# Patient Record
Sex: FEMALE | Race: OTHER | HISPANIC OR LATINO | Employment: FULL TIME | URBAN - METROPOLITAN AREA
[De-identification: names, ages, dates, MRNs, and addresses within clinical notes are randomized per-mention and may not be internally consistent; named-entity substitution may affect disease eponyms.]

---

## 2018-10-05 ENCOUNTER — OFFICE VISIT (OUTPATIENT)
Dept: URGENT CARE | Age: 23
End: 2018-10-05
Payer: COMMERCIAL

## 2018-10-05 VITALS
SYSTOLIC BLOOD PRESSURE: 124 MMHG | DIASTOLIC BLOOD PRESSURE: 81 MMHG | HEART RATE: 99 BPM | OXYGEN SATURATION: 98 % | TEMPERATURE: 97.8 F | HEIGHT: 64 IN | RESPIRATION RATE: 20 BRPM

## 2018-10-05 DIAGNOSIS — J06.9 UPPER RESPIRATORY INFECTION, VIRAL: Primary | ICD-10-CM

## 2018-10-05 PROCEDURE — 99283 EMERGENCY DEPT VISIT LOW MDM: CPT | Performed by: FAMILY MEDICINE

## 2018-10-05 PROCEDURE — G0382 LEV 3 HOSP TYPE B ED VISIT: HCPCS | Performed by: FAMILY MEDICINE

## 2018-10-05 RX ORDER — PSEUDOEPHEDRINE HCL 60 MG/1
60 TABLET ORAL EVERY 6 HOURS PRN
Qty: 30 TABLET | Refills: 0 | Status: SHIPPED | OUTPATIENT
Start: 2018-10-05 | End: 2019-05-29

## 2018-10-05 RX ORDER — FLUTICASONE PROPIONATE 50 MCG
1 SPRAY, SUSPENSION (ML) NASAL DAILY
Qty: 16 G | Refills: 0 | Status: SHIPPED | OUTPATIENT
Start: 2018-10-05 | End: 2019-05-29

## 2018-10-05 NOTE — PATIENT INSTRUCTIONS
Prescriptions sent to pharmacy for pseudoephedrine and Flonase-use as directed  Cool mist humidifier, saline nasal spray  Tylenol/ibuprofen as needed for fever or pain  Follow up with PCP in 3-5 days  Proceed to  ER if symptoms worsen  Síntomas de resfriado   CUIDADO AMBULATORIO:   Síntomas de resfriado  incluyen estornudos, garganta seca, congestión nasal, dolor de [place], ojos [place], y tos  La tos podría ser seca o incluso contener flemas  Usted también podría sentir mauro musculares, dolor en las articulaciones y cansancio  La fiebre no es un síntoma común del resfrío tuyet podría suceder  Los síntomas del resfriado pueden ocurrir debido a la inflamación de las vías respiratorias altas causada por un virus  La mayoría de los resfriados se RUST sin tratamiento  Busque atención médica de inmediato si:   · Usted siente más cansancio y debilidad  · Usted no puede comer  · Seymour corazón está latiendo [employer] rápido de lo normal en seymour elmer  · Usted nota manchas michael en la parte de atrás de seymour garganta y seymour cristin está inflamado y adolorido al tacto  · Usted nota puntos rojos o morados pequeños o grandes en seymour piel  Pregúntele a seymour Rogue Beady vitaminas y minerales son adecuados para usted  · Usted tiene fiebre por encima de los 102°F (38 9°C)  · Usted tiene falta de la aire que es reciente o que [place]  · Usted tiene veronica secreción espesa saliéndole de la nariz por más de 2 días  · Judy síntomas no mejoran o más [place] empeoran en cuestión de 5 días  · Usted tiene preguntas o inquietudes acerca de seymour condición o cuidado  El tratamiento para los síntomas de un resfriado  pueden incluir los analgésicos antiinflamatorios no esteroides para disminuir el dolor en los músculos y la [place]   Los medicamentos para el resfriado también se pueden administrar para disminuir la tos, la congestión nasal, el estornudo y goteo nasal    Maneje los síntomas del resfriado:  Los siguientes podría ayudar a [employer] del resfrío, real por ejemplo la garganta seca y la congestión:  · Gárgaras con enjuague bucal o agua salada tibia según las instrucciones  · Pastillas para la garganta o golosinas duras  · Vaporizador de aristeo fría o tibia o humidificador para aliviar la respiración  · [place] de por lo menos 2 días y luego según lo necesario para ir eliminando el cansancio y la debilidad  · Póngase crema a base de petrolato alrededor de las fosas nasales para disminuir la irritación causada por estarse limpiando tanto la nariz  · [place] suficiente líquidos  Los líquidos le ayudarán a aflojar y disolver la mucosidad espesa para que la pueda expectorar  Los líquidos también lo mantendrán hidratado  Pregúntele a vizcarra médico cuáles líquidos le recomienda y cuánta cantidad debe niles cada día  Prevenga la propagación de gérmenes  al lavarse las enedelia con frecuencia  Es posible propagar los gérmenes del resfriado a otras personas por lo menos por 3 días después de isaura iniciado los síntomas  No comparta artículos, real utensilios de cocina  Cúbrase la nariz y la boca cuando tose o estornuda usando la parte de adentro del codo en vez de las enedelia  Tire en la basura las toallas desechables que usó para limpiarse la nariz  No fume:  El fumar podría empeorar ernie síntomas y aumentar la duración de vizcarra resfriado  Hable con vizcarra médico si necesita ayuda para dejar de fumar  Acuda a ernie consultas de control con vizcarra médico según le indicaron  Anote ernie preguntas para que se acuerde de hacerlas regino ernie visitas  © 2017 [address] [employer] Information is for End User's use only and may not be sold, redistributed or otherwise used for commercial purposes  All illustrations and images included in CareNotes® are the copyrighted property of A D A M , Inc  or Mau Sánchez  Esta información es sólo para uso en educación   Vizcarra intención no es darle un consejo [employer] enfermedades o tratamientos  Colsulte con seymour Jose Pinta farmacéutico antes de seguir cualquier régimen médico para saber si es seguro y efectivo para usted

## 2018-10-05 NOTE — PROGRESS NOTES
[facility]        NAME: Yayo More is a 26 y o  female  : 1995    MRN: [id number]  DATE: 2018  TIME: 1:10 PM    Assessment and Plan   Upper respiratory infection, viral [J06 9]  1  Upper respiratory infection, viral  pseudoephedrine (SUDAFED) 60 mg tablet    fluticasone (FLONASE) 50 mcg/act nasal spray         Patient Instructions   Prescriptions sent to pharmacy for pseudoephedrine and Flonase-use as directed  Cool mist humidifier, saline nasal spray  Tylenol/ibuprofen as needed for fever or pain  Follow up with PCP in 3-5 days  Proceed to  ER if symptoms worsen  Chief Complaint     Chief Complaint   Patient presents with    Sore Throat     and ear ache started yesterday          History of Present Illness   The patient is a 59-year-old female who presents with cold-like symptoms that started yesterday  The she complains of nasal and sinus congestion  Negative sinus point tenderness  Positive bilateral ear ache  Negative hearing changes  Negative drainage  Negative cough  Negative shortness of breath or wheezing  Negative abdominal pain, nausea, vomiting or diarrhea  Negative fever or chills  Negative myalgias  HPI    Review of Systems   Review of Systems   Constitutional: Negative for activity change, chills, fatigue and fever  HENT: Positive for congestion, postnasal drip, rhinorrhea, sinus pressure and sore throat  Negative for ear discharge, ear pain, facial swelling, mouth sores, sinus pain, sneezing and trouble swallowing  Eyes: Negative for pain, discharge, redness and itching  Respiratory: Negative for apnea, cough, chest tightness, shortness of breath, wheezing and stridor  Cardiovascular: Negative for chest pain  Gastrointestinal: Negative for abdominal distention, abdominal pain, diarrhea, nausea and vomiting  Genitourinary: Negative for difficulty urinating  Musculoskeletal: Negative for arthralgias and myalgias     Skin: Negative for pallor and rash  Allergic/Immunologic: Negative  Neurological: Negative for dizziness, light-headedness and headaches  Hematological: Negative  Psychiatric/Behavioral: Negative  All other systems reviewed and are negative  Current Medications       Current Outpatient Prescriptions:     fluticasone (FLONASE) 50 mcg/act nasal spray, 1 spray into each nostril daily, Disp: 16 g, Rfl: 0    pseudoephedrine (SUDAFED) 60 mg tablet, Take 1 tablet (60 mg total) by mouth every 6 (six) hours as needed for congestion, Disp: 30 tablet, Rfl: 0    Current Allergies     Allergies as of 10/05/2018    (No Known Allergies)            The following portions of the patient's history were reviewed and updated as appropriate: allergies, current medications, past family history, past medical history, past social history, past surgical history and problem list      History reviewed  No pertinent past medical history  History reviewed  No pertinent surgical history  No family history on file  Medications have been verified  Objective   /81   Pulse 99   Temp 97 8 °F (36 6 °C)   Resp 20   Ht 5' 4" (1 626 m)   SpO2 98%        Physical Exam     Physical Exam   Constitutional: She is oriented to person, place, and time  She appears well-developed and well-nourished  No distress  HENT:   Head: Normocephalic and atraumatic  Right Ear: Hearing, tympanic membrane, external ear and ear canal normal  No drainage or tenderness  Tympanic membrane is not perforated, not erythematous, not retracted and not bulging  No middle ear effusion  No decreased hearing is noted  Left Ear: Hearing, tympanic membrane, external ear and ear canal normal  No drainage or tenderness  Tympanic membrane is not perforated, not erythematous, not retracted and not bulging  No middle ear effusion  No decreased hearing is noted  Nose: Rhinorrhea present  No mucosal edema or septal deviation   Right sinus exhibits no maxillary sinus tenderness and no frontal sinus tenderness  Left sinus exhibits no maxillary sinus tenderness and no frontal sinus tenderness  Mouth/Throat: Uvula is midline and mucous membranes are normal  No oral lesions  No dental abscesses or uvula swelling  Posterior oropharyngeal erythema present  No oropharyngeal exudate, posterior oropharyngeal edema or tonsillar abscesses  Eyes: Pupils are equal, round, and reactive to light  Conjunctivae and EOM are normal    Neck: Trachea normal, normal range of motion and full passive range of motion without pain  Neck supple  No JVD present  No edema and no erythema present  No thyromegaly present  Cardiovascular: Normal rate, regular rhythm, S1 normal, S2 normal, normal heart sounds, intact distal pulses and normal pulses  No murmur heard  Pulmonary/Chest: Effort normal and breath sounds normal  No accessory muscle usage or stridor  No respiratory distress  She has no wheezes  Abdominal: Soft  Normal appearance and bowel sounds are normal  She exhibits no distension  There is no hepatosplenomegaly  There is no tenderness  Musculoskeletal: Normal range of motion  She exhibits no edema  Neurological: She is alert and oriented to person, place, and time  Skin: Skin is warm, dry and intact  No abrasion, no lesion and no rash noted  She is not diaphoretic  No cyanosis or erythema  Nails show no clubbing  Psychiatric: She has a normal mood and affect  Her speech is normal and behavior is normal    Nursing note and vitals reviewed

## 2018-10-05 NOTE — LETTER
October 5, 2018     Patient: Aquilino Shelley   YOB: 1995   Date of Visit: 10/5/2018       To Whom it May Concern:    Aquilino Shelley is under my professional care  She was seen in my office on 10/5/2018  She may return to work 10/6/2018  If you have any questions or concerns, please don't hesitate to call           Sincerely,          Selene Mai PA-C        CC: No Recipients

## 2019-05-29 ENCOUNTER — HOSPITAL ENCOUNTER (EMERGENCY)
Facility: HOSPITAL | Age: 24
Discharge: HOME | End: 2019-05-29
Attending: EMERGENCY MEDICINE
Payer: COMMERCIAL

## 2019-05-29 VITALS
TEMPERATURE: 98.3 F | RESPIRATION RATE: 16 BRPM | WEIGHT: 196 LBS | BODY MASS INDEX: 33.64 KG/M2 | DIASTOLIC BLOOD PRESSURE: 77 MMHG | HEART RATE: 81 BPM | SYSTOLIC BLOOD PRESSURE: 119 MMHG | OXYGEN SATURATION: 99 %
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
## 2019-05-29 PROCEDURE — 99282 EMERGENCY DEPT VISIT SF MDM: CPT

## 2019-05-29 PROCEDURE — 99282 EMERGENCY DEPT VISIT SF MDM: CPT | Performed by: PHYSICIAN ASSISTANT

## 2019-05-29 RX ORDER — DIPHENHYDRAMINE HCL 25 MG
25 TABLET ORAL EVERY 6 HOURS
Qty: 20 TABLET | Refills: 0 | Status: SHIPPED | OUTPATIENT
Start: 2019-05-29

## 2020-07-10 ENCOUNTER — OFFICE VISIT (OUTPATIENT)
Dept: URGENT CARE | Age: 25
End: 2020-07-10
Payer: COMMERCIAL

## 2020-07-10 VITALS
BODY MASS INDEX: 31.58 KG/M2 | HEART RATE: 72 BPM | OXYGEN SATURATION: 99 % | RESPIRATION RATE: 14 BRPM | WEIGHT: 185 LBS | TEMPERATURE: 97.8 F | HEIGHT: 64 IN

## 2020-07-10 DIAGNOSIS — Z20.828 EXPOSURE TO SARS-ASSOCIATED CORONAVIRUS: Primary | ICD-10-CM

## 2020-07-10 PROCEDURE — 99283 EMERGENCY DEPT VISIT LOW MDM: CPT | Performed by: PHYSICIAN ASSISTANT

## 2020-07-10 PROCEDURE — 99203 OFFICE O/P NEW LOW 30 MIN: CPT | Performed by: PHYSICIAN ASSISTANT

## 2020-07-10 PROCEDURE — G0382 LEV 3 HOSP TYPE B ED VISIT: HCPCS | Performed by: PHYSICIAN ASSISTANT

## 2020-07-10 PROCEDURE — U0003 INFECTIOUS AGENT DETECTION BY NUCLEIC ACID (DNA OR RNA); SEVERE ACUTE RESPIRATORY SYNDROME CORONAVIRUS 2 (SARS-COV-2) (CORONAVIRUS DISEASE [COVID-19]), AMPLIFIED PROBE TECHNIQUE, MAKING USE OF HIGH THROUGHPUT TECHNOLOGIES AS DESCRIBED BY CMS-2020-01-R: HCPCS | Performed by: PHYSICIAN ASSISTANT

## 2020-07-10 NOTE — PROGRESS NOTES
[facility]      NAME: Fatemeh Reyna is a 26 y o  female  : 1995    MRN: [id number]  DATE: July 10, 2020  TIME: 2:51 PM    Assessment and Plan   Exposure to SARS-associated coronavirus [Z20 828]  1  Exposure to SARS-associated coronavirus  MISC COVID-19 TEST- Office Collection       Patient Instructions     Follow up with PCP in 3-5 days  Proceed to  ER if symptoms worsen  Patient tested for COVID  Patient will be under strict isolation until otherwise stated  Tylenol as needed for fever or chills  Monitor symptoms closely  Chief Complaint     Chief Complaint   Patient presents with   31 60 98     pt was exposed to someone at work and while traveling with covid  pt states she now has a headache  denies any other symptoms         History of Present Illness       Patient is a 23-year-old female presenting to the office for cough  Patient states that she was recently in [place] came in contact with a person who tested positive for COVID  Patient states that the cough is dry and nonproductive in nature  Patient denies any fevers any chills  Patient denies any problems with her eyes ears nose throat  Patient has any shortness of breath chest tightness chest pain  Patient denies any nausea vomiting diarrhea  Patient has any muscle aches body aches  Patient denies any headache, neck pain, neck stiffness, dizziness, confusion  Patient offers no other complaints this time  Review of Systems   Review of Systems   Constitutional: Negative  HENT: Negative  Eyes: Negative  Respiratory: Positive for cough  Negative for apnea, choking, chest tightness, shortness of breath, wheezing and stridor  Cardiovascular: Negative  Gastrointestinal: Negative  Endocrine: Negative  Genitourinary: Negative  Musculoskeletal: Negative  Skin: Negative  Allergic/Immunologic: Negative  Neurological: Negative  Hematological: Negative      Psychiatric/Behavioral: Negative  Current Medications       Current Outpatient Medications:     diphenhydrAMINE (BENADRYL) 25 mg tablet, Take 1 tablet (25 mg total) by mouth every 6 (six) hours, Disp: 20 tablet, Rfl: 0    Current Allergies     Allergies as of 07/10/2020    (No Known Allergies)            The following portions of the patient's history were reviewed and updated as appropriate: allergies, current medications, past family history, past medical history, past social history, past surgical history and problem list      No past medical history on file  No past surgical history on file  No family history on file  Medications have been verified  Objective   Ht 5' 4" (1 626 m)   Wt 83 9 kg (185 lb)   BMI 31 76 kg/m²        Physical Exam     Physical Exam   Constitutional: She is oriented to person, place, and time  She appears well-developed and well-nourished  HENT:   Head: Normocephalic  Right Ear: External ear normal    Left Ear: External ear normal    Nose: Nose normal    Mouth/Throat: Oropharynx is clear and moist    Eyes: Pupils are equal, round, and reactive to light  Conjunctivae and EOM are normal    Neck: Normal range of motion  Cardiovascular: Normal rate, regular rhythm, normal heart sounds and intact distal pulses  Pulmonary/Chest: Effort normal and breath sounds normal    Neurological: She is alert and oriented to person, place, and time  Skin: Skin is warm  Capillary refill takes less than 2 seconds  Psychiatric: She has a normal mood and affect  Her behavior is normal  Judgment and thought content normal    Nursing note and vitals reviewed

## 2020-07-10 NOTE — PATIENT INSTRUCTIONS
Follow up with PCP in 3-5 days  Proceed to  ER if symptoms worsen  Patient tested for COVID  Patient will be under strict isolation until otherwise stated  Tylenol as needed for fever or chills  Monitor symptoms closely  [address]    Your healthcare provider and/or public health staff have evaluated you and have determined that you do not need to remain in the hospital at this time  At this time you can be isolated at home where you will be monitored by staff from your local or state health department  You should carefully follow the prevention and isolation steps below until a healthcare provider or local or state health department says that you can return to your normal activities  Stay home except to get medical care    People who are mildly ill with COVID-19 are able to isolate at home during their illness  You should restrict activities outside your home, except for getting medical care  Do not go to work, school, or public areas  Avoid using public transportation, ride-sharing, or taxis  Separate yourself from other people and animals in your home    People: As much as possible, you should stay in a specific room and away from other people in your home  Also, you should use a separate bathroom, if available  Animals: You should restrict contact with pets and other animals while you are sick with COVID-19, just like you would around other people  Although there have not been reports of pets or other animals becoming sick with COVID-19, it is still recommended that people sick with COVID-19 limit contact with animals until more information is known about the virus  When possible, have another member of your household care for your animals while you are sick  If you are sick with COVID-19, avoid contact with your pet, including petting, snuggling, being kissed or licked, and sharing food   If you must care for your pet or be around animals while you are sick, wash your hands before and after you interact with pets and wear a facemask  See COVID-19 and Animals for more information  Call ahead before visiting your doctor    If you have a medical appointment, call the healthcare provider and tell them that you have or may have COVID-19  This will help the healthcare providers office take steps to keep other people from getting infected or exposed  Wear a facemask    You should wear a facemask when you are around other people (e g , sharing a room or vehicle) or pets and before you enter a healthcare providers office  If you are not able to wear a facemask (for example, because it causes trouble breathing), then people who live with you should not stay in the same room with you, or they should wear a facemask if they enter your room  Cover your coughs and sneezes    Cover your mouth and nose with a tissue when you cough or sneeze  Throw used tissues in a lined trash can  Immediately wash your hands with soap and water for at least 20 seconds or, if soap and water are not available, clean your hands with an alcohol-based hand  that contains at least 60% alcohol  Clean your hands often    Wash your hands often with soap and water for at least 20 seconds, especially after blowing your nose, coughing, or sneezing; going to the bathroom; and before eating or preparing food  If soap and water are not readily available, use an alcohol-based hand  with at least 60% alcohol, covering all surfaces of your hands and rubbing them together until they feel dry  Soap and water are the best option if hands are visibly dirty  Avoid touching your eyes, nose, and mouth with unwashed hands  Avoid sharing personal household items    You should not share dishes, drinking glasses, cups, eating utensils, towels, or bedding with other people or pets in your home  After using these items, they should be washed thoroughly with soap and water      Clean all high-touch surfaces everyday    High touch surfaces include counters, tabletops, doorknobs, bathroom fixtures, toilets, phones, keyboards, tablets, and bedside tables  Also, clean any surfaces that may have blood, stool, or body fluids on them  Use a household cleaning spray or wipe, according to the label instructions  Labels contain instructions for safe and effective use of the cleaning product including precautions you should take when applying the product, such as wearing gloves and making sure you have good ventilation during use of the product  Monitor your symptoms    Seek prompt medical attention if your illness is worsening (e g , difficulty breathing)  Before seeking care, call your healthcare provider and tell them that you have, or are being evaluated for, COVID-19  Put on a facemask before you enter the facility  These steps will help the healthcare providers office to keep other people in the office or waiting room from getting infected or exposed  Ask your healthcare provider to call the local or [facility] health department  Persons who are placed under active monitoring or facilitated self-monitoring should follow instructions provided by their local health department or occupational health professionals, as appropriate  If you have a medical emergency and need to call 911, notify the dispatch personnel that you have, or are being evaluated for COVID-19  If possible, put on a facemask before emergency medical services arrive      Discontinuing home isolation    Patients with confirmed COVID-19 should remain under home isolation precautions until the following conditions are met:   - They have had no fever for at least 72 hours (that is three full days of no fever without the use medicine that reduces fevers)  AND  - other symptoms have improved (for example, when their cough or shortness of breath have improved)  AND  - at least 10 days have passed since their symptoms first appeared  Patients with confirmed COVID-19 should also notify close contacts (including their workplace) and ask that they self-quarantine  Currently, close contact is defined as being within 6 feet for 10 minutes or more from the period 48 hours before symptom onset to the time at which the patient went into isolation  Close contacts of patients diagnosed with COVID-19 should be instructed by the patient to self-quarantine for 14 days from the last time of their last contact with the patient       Source: [employer]

## 2020-07-17 LAB — SARS-COV-2 RNA SPEC QL NAA+PROBE: NOT DETECTED

## 2020-07-18 ENCOUNTER — TELEPHONE (OUTPATIENT)
Dept: URGENT CARE | Age: 25
End: 2020-07-18

## 2020-07-20 ENCOUNTER — TELEPHONE (OUTPATIENT)
Dept: OTHER | Facility: OTHER | Age: 25
End: 2020-07-20

## 2020-07-20 NOTE — TELEPHONE ENCOUNTER
Your test for COVID-19, also known as novel coronavirus, came back negative  You do not have COVID-19  If you have any additional questions, we can schedule a virtual visit for you with a provider or call the [facility] hotline [phone] Option 7 for care advice  For additional information , please visit the Coronavirus FAQ on the [address] [facility]  Patient is feeling fine, has no further needs at this time

## 2020-11-11 ENCOUNTER — HOSPITAL ENCOUNTER (EMERGENCY)
Facility: HOSPITAL | Age: 25
Discharge: HOME | End: 2020-11-11
Attending: EMERGENCY MEDICINE | Admitting: EMERGENCY MEDICINE
Payer: COMMERCIAL

## 2020-11-11 VITALS
WEIGHT: 205.25 LBS | RESPIRATION RATE: 16 BRPM | SYSTOLIC BLOOD PRESSURE: 124 MMHG | HEART RATE: 77 BPM | DIASTOLIC BLOOD PRESSURE: 60 MMHG | TEMPERATURE: 98.8 F | OXYGEN SATURATION: 100 %

## 2020-11-11 DIAGNOSIS — B34.9 VIRAL ILLNESS: Primary | ICD-10-CM

## 2020-11-11 PROCEDURE — 99282 EMERGENCY DEPT VISIT SF MDM: CPT | Performed by: EMERGENCY MEDICINE

## 2020-11-11 PROCEDURE — 99283 EMERGENCY DEPT VISIT LOW MDM: CPT

## 2020-11-11 PROCEDURE — 87637 SARSCOV2&INF A&B&RSV AMP PRB: CPT | Performed by: PHYSICIAN ASSISTANT

## 2020-11-11 RX ORDER — IBUPROFEN 400 MG/1
400 TABLET ORAL ONCE
Status: COMPLETED | OUTPATIENT
Start: 2020-11-11 | End: 2020-11-11

## 2020-11-11 RX ADMIN — IBUPROFEN 400 MG: 400 TABLET ORAL at 18:43

## 2020-11-14 LAB
FLUAV RNA NPH QL NAA+PROBE: NOT DETECTED
FLUBV RNA NPH QL NAA+PROBE: NOT DETECTED
RSV RNA NPH QL NAA+PROBE: NOT DETECTED
SARS-COV-2 RNA NPH QL NAA+PROBE: NOT DETECTED

## 2020-12-18 ENCOUNTER — OCCMED (OUTPATIENT)
Dept: URGENT CARE | Facility: MEDICAL CENTER | Age: 25
End: 2020-12-18
Payer: OTHER MISCELLANEOUS

## 2020-12-18 ENCOUNTER — APPOINTMENT (OUTPATIENT)
Dept: RADIOLOGY | Facility: MEDICAL CENTER | Age: 25
End: 2020-12-18
Attending: PHYSICIAN ASSISTANT
Payer: OTHER MISCELLANEOUS

## 2020-12-18 DIAGNOSIS — S99.922A INJURY OF LEFT FOOT, INITIAL ENCOUNTER: Primary | ICD-10-CM

## 2020-12-18 DIAGNOSIS — S99.922A INJURY OF LEFT FOOT, INITIAL ENCOUNTER: ICD-10-CM

## 2020-12-18 PROCEDURE — 73630 X-RAY EXAM OF FOOT: CPT

## 2020-12-18 PROCEDURE — 99283 EMERGENCY DEPT VISIT LOW MDM: CPT | Performed by: PHYSICIAN ASSISTANT

## 2020-12-18 PROCEDURE — G0382 LEV 3 HOSP TYPE B ED VISIT: HCPCS | Performed by: PHYSICIAN ASSISTANT

## 2021-02-16 ENCOUNTER — APPOINTMENT (EMERGENCY)
Dept: CT IMAGING | Facility: HOSPITAL | Age: 26
End: 2021-02-16
Payer: COMMERCIAL

## 2021-02-16 ENCOUNTER — APPOINTMENT (EMERGENCY)
Dept: ULTRASOUND IMAGING | Facility: HOSPITAL | Age: 26
End: 2021-02-16
Payer: COMMERCIAL

## 2021-02-16 ENCOUNTER — HOSPITAL ENCOUNTER (EMERGENCY)
Facility: HOSPITAL | Age: 26
Discharge: HOME | End: 2021-02-16
Attending: EMERGENCY MEDICINE | Admitting: EMERGENCY MEDICINE
Payer: COMMERCIAL

## 2021-02-16 VITALS
RESPIRATION RATE: 14 BRPM | TEMPERATURE: 98.8 F | SYSTOLIC BLOOD PRESSURE: 120 MMHG | BODY MASS INDEX: 35 KG/M2 | WEIGHT: 205.03 LBS | HEIGHT: 64 IN | HEART RATE: 89 BPM | DIASTOLIC BLOOD PRESSURE: 67 MMHG | OXYGEN SATURATION: 100 %

## 2021-02-16 DIAGNOSIS — K29.70 GASTRITIS: ICD-10-CM

## 2021-02-16 DIAGNOSIS — R10.9 ABDOMINAL PAIN: Primary | ICD-10-CM

## 2021-02-16 LAB
ALBUMIN SERPL BCP-MCNC: 3.9 G/DL (ref 3.5–5)
ALP SERPL-CCNC: 71 U/L (ref 46–116)
ALT SERPL W P-5'-P-CCNC: 30 U/L (ref 12–78)
ANION GAP SERPL CALCULATED.3IONS-SCNC: 8 MMOL/L (ref 4–13)
AST SERPL W P-5'-P-CCNC: 20 U/L (ref 5–45)
BACTERIA UR QL AUTO: ABNORMAL /HPF
BASOPHILS # BLD AUTO: 0.01 THOUSANDS/ΜL (ref 0–0.1)
BASOPHILS NFR BLD AUTO: 0 % (ref 0–1)
BILIRUB SERPL-MCNC: 0.4 MG/DL (ref 0.2–1)
BILIRUB UR QL STRIP: NEGATIVE
BUN SERPL-MCNC: 16 MG/DL (ref 5–25)
CALCIUM SERPL-MCNC: 9.4 MG/DL (ref 8.3–10.1)
CHLORIDE SERPL-SCNC: 101 MMOL/L (ref 100–108)
CLARITY UR: CLEAR
CO2 SERPL-SCNC: 28 MMOL/L (ref 21–32)
COLOR UR: YELLOW
CREAT SERPL-MCNC: 1.03 MG/DL (ref 0.6–1.3)
EOSINOPHIL # BLD AUTO: 0 THOUSAND/ΜL (ref 0–0.61)
EOSINOPHIL NFR BLD AUTO: 0 % (ref 0–6)
ERYTHROCYTE [DISTWIDTH] IN BLOOD BY AUTOMATED COUNT: 13.3 % (ref 11.6–15.1)
EXT PREG TEST URINE: NORMAL
EXT. CONTROL ED NAV: NORMAL
GFR SERPL CREATININE-BSD FRML MDRD: 76 ML/MIN/1.73SQ M
GLUCOSE SERPL-MCNC: 111 MG/DL (ref 65–140)
GLUCOSE UR STRIP-MCNC: NEGATIVE MG/DL
HCT VFR BLD AUTO: 42 % (ref 34.8–46.1)
HGB BLD-MCNC: 13.7 G/DL (ref 11.5–15.4)
HGB UR QL STRIP.AUTO: ABNORMAL
IMM GRANULOCYTES # BLD AUTO: 0.03 THOUSAND/UL (ref 0–0.2)
IMM GRANULOCYTES NFR BLD AUTO: 0 % (ref 0–2)
KETONES UR STRIP-MCNC: NEGATIVE MG/DL
LEUKOCYTE ESTERASE UR QL STRIP: NEGATIVE
LIPASE SERPL-CCNC: 83 U/L (ref 73–393)
LYMPHOCYTES # BLD AUTO: 0.66 THOUSANDS/ΜL (ref 0.6–4.47)
LYMPHOCYTES NFR BLD AUTO: 7 % (ref 14–44)
MCH RBC QN AUTO: 30.5 PG (ref 26.8–34.3)
MCHC RBC AUTO-ENTMCNC: 32.6 G/DL (ref 31.4–37.4)
MCV RBC AUTO: 94 FL (ref 82–98)
MONOCYTES # BLD AUTO: 0.34 THOUSAND/ΜL (ref 0.17–1.22)
MONOCYTES NFR BLD AUTO: 4 % (ref 4–12)
NEUTROPHILS # BLD AUTO: 8.17 THOUSANDS/ΜL (ref 1.85–7.62)
NEUTS SEG NFR BLD AUTO: 89 % (ref 43–75)
NITRITE UR QL STRIP: NEGATIVE
NON-SQ EPI CELLS URNS QL MICRO: ABNORMAL /HPF
NRBC BLD AUTO-RTO: 0 /100 WBCS
PH UR STRIP.AUTO: 6 [PH]
PLATELET # BLD AUTO: 253 THOUSANDS/UL (ref 149–390)
PMV BLD AUTO: 10.8 FL (ref 8.9–12.7)
POTASSIUM SERPL-SCNC: 3.7 MMOL/L (ref 3.5–5.3)
PROT SERPL-MCNC: 8.5 G/DL (ref 6.4–8.2)
PROT UR STRIP-MCNC: NEGATIVE MG/DL
RBC # BLD AUTO: 4.49 MILLION/UL (ref 3.81–5.12)
RBC #/AREA URNS AUTO: ABNORMAL /HPF
SODIUM SERPL-SCNC: 137 MMOL/L (ref 136–145)
SP GR UR STRIP.AUTO: 1.02 (ref 1–1.03)
UROBILINOGEN UR QL STRIP.AUTO: 0.2 E.U./DL
WBC # BLD AUTO: 9.21 THOUSAND/UL (ref 4.31–10.16)
WBC #/AREA URNS AUTO: ABNORMAL /HPF

## 2021-02-16 PROCEDURE — 81025 URINE PREGNANCY TEST: CPT | Performed by: EMERGENCY MEDICINE

## 2021-02-16 PROCEDURE — 99283 EMERGENCY DEPT VISIT LOW MDM: CPT | Performed by: EMERGENCY MEDICINE

## 2021-02-16 PROCEDURE — 74177 CT ABD & PELVIS W/CONTRAST: CPT

## 2021-02-16 PROCEDURE — 85025 COMPLETE CBC W/AUTO DIFF WBC: CPT | Performed by: EMERGENCY MEDICINE

## 2021-02-16 PROCEDURE — 81001 URINALYSIS AUTO W/SCOPE: CPT | Performed by: EMERGENCY MEDICINE

## 2021-02-16 PROCEDURE — 96374 THER/PROPH/DIAG INJ IV PUSH: CPT

## 2021-02-16 PROCEDURE — 96361 HYDRATE IV INFUSION ADD-ON: CPT

## 2021-02-16 PROCEDURE — 80053 COMPREHEN METABOLIC PANEL: CPT | Performed by: EMERGENCY MEDICINE

## 2021-02-16 PROCEDURE — 76705 ECHO EXAM OF ABDOMEN: CPT

## 2021-02-16 PROCEDURE — 36415 COLL VENOUS BLD VENIPUNCTURE: CPT | Performed by: EMERGENCY MEDICINE

## 2021-02-16 PROCEDURE — 96375 TX/PRO/DX INJ NEW DRUG ADDON: CPT

## 2021-02-16 PROCEDURE — 99284 EMERGENCY DEPT VISIT MOD MDM: CPT

## 2021-02-16 PROCEDURE — 83690 ASSAY OF LIPASE: CPT | Performed by: EMERGENCY MEDICINE

## 2021-02-16 RX ORDER — LIDOCAINE HYDROCHLORIDE 20 MG/ML
15 SOLUTION OROPHARYNGEAL ONCE
Status: COMPLETED | OUTPATIENT
Start: 2021-02-16 | End: 2021-02-16

## 2021-02-16 RX ORDER — ONDANSETRON 4 MG/1
4 TABLET, ORALLY DISINTEGRATING ORAL EVERY 8 HOURS PRN
Qty: 10 TABLET | Refills: 0 | Status: SHIPPED | OUTPATIENT
Start: 2021-02-16 | End: 2021-02-21

## 2021-02-16 RX ORDER — MAGNESIUM HYDROXIDE/ALUMINUM HYDROXICE/SIMETHICONE 120; 1200; 1200 MG/30ML; MG/30ML; MG/30ML
30 SUSPENSION ORAL ONCE
Status: COMPLETED | OUTPATIENT
Start: 2021-02-16 | End: 2021-02-16

## 2021-02-16 RX ORDER — FAMOTIDINE 20 MG/1
20 TABLET, FILM COATED ORAL 2 TIMES DAILY
Qty: 10 TABLET | Refills: 0 | Status: SHIPPED | OUTPATIENT
Start: 2021-02-16 | End: 2021-02-21

## 2021-02-16 RX ORDER — MORPHINE SULFATE 4 MG/ML
4 INJECTION, SOLUTION INTRAMUSCULAR; INTRAVENOUS ONCE
Status: COMPLETED | OUTPATIENT
Start: 2021-02-16 | End: 2021-02-16

## 2021-02-16 RX ORDER — DICYCLOMINE HCL 20 MG
20 TABLET ORAL 2 TIMES DAILY
Qty: 10 TABLET | Refills: 0 | Status: SHIPPED | OUTPATIENT
Start: 2021-02-16 | End: 2021-02-21

## 2021-02-16 RX ORDER — ACETAMINOPHEN 325 MG/1
975 TABLET ORAL ONCE
Status: COMPLETED | OUTPATIENT
Start: 2021-02-16 | End: 2021-02-16

## 2021-02-16 RX ORDER — ONDANSETRON 2 MG/ML
4 INJECTION INTRAMUSCULAR; INTRAVENOUS ONCE
Status: COMPLETED | OUTPATIENT
Start: 2021-02-16 | End: 2021-02-16

## 2021-02-16 RX ADMIN — ALUMINUM HYDROXIDE, MAGNESIUM HYDROXIDE, AND SIMETHICONE 30 ML: 200; 200; 20 SUSPENSION ORAL at 20:39

## 2021-02-16 RX ADMIN — ACETAMINOPHEN 975 MG: 325 TABLET ORAL at 19:33

## 2021-02-16 RX ADMIN — MORPHINE SULFATE 4 MG: 4 INJECTION INTRAVENOUS at 17:09

## 2021-02-16 RX ADMIN — LIDOCAINE HYDROCHLORIDE 15 ML: 20 SOLUTION ORAL; TOPICAL at 20:39

## 2021-02-16 RX ADMIN — IODIXANOL 100 ML: 320 INJECTION, SOLUTION INTRAVASCULAR at 20:13

## 2021-02-16 RX ADMIN — ONDANSETRON 4 MG: 2 INJECTION INTRAMUSCULAR; INTRAVENOUS at 17:06

## 2021-02-16 RX ADMIN — FAMOTIDINE 20 MG: 10 INJECTION INTRAVENOUS at 20:39

## 2021-02-16 RX ADMIN — SODIUM CHLORIDE 1000 ML: 0.9 INJECTION, SOLUTION INTRAVENOUS at 17:04

## 2021-02-16 NOTE — ED PROVIDER NOTES
History  Chief Complaint   Patient presents with    Abdominal Pain     RUQ abd pain since 0600 with vomiting       History provided by:  Patient   used: No    Abdominal Pain  Pain location:  RUQ  Pain quality: aching    Pain radiates to:  Does not radiate  Pain severity:  Moderate  Onset quality:  Gradual  Duration:  12 hours  Timing:  Constant  Progression:  Waxing and waning  Chronicity:  New  Context comment:  RUQ pain with Vomiting since morning  Relieved by:  Nothing  Worsened by:  Nothing  Ineffective treatments:  None tried  Associated symptoms: vomiting    Associated symptoms: no chest pain, no chills, no cough, no diarrhea, no dysuria, no fever, no nausea, no shortness of breath and no sore throat    Vomiting:     Quality:  Stomach contents    Number of occurrences:  Unable to specify    Severity:  Moderate    Duration:  12 hours    Timing:  Intermittent    Progression:  Unchanged  Risk factors: not pregnant        Prior to Admission Medications   Prescriptions Last Dose Informant Patient Reported? Taking? diphenhydrAMINE (BENADRYL) 25 mg tablet Not Taking at Unknown time  No No   Sig: Take 1 tablet (25 mg total) by mouth every 6 (six) hours   Patient not taking: Reported on 2/16/2021      Facility-Administered Medications: None       Past Medical History:   Diagnosis Date    Prediabetes        History reviewed  No pertinent surgical history  History reviewed  No pertinent family history  I have reviewed and agree with the history as documented  E-Cigarette/Vaping     E-Cigarette/Vaping Substances     Social History     Tobacco Use    Smoking status: Never Smoker    Smokeless tobacco: Never Used   Substance Use Topics    Alcohol use: Not Currently    Drug use: Never       Review of Systems   Constitutional: Negative for chills and fever  HENT: Negative for facial swelling, sore throat and trouble swallowing  Eyes: Negative for pain and visual disturbance  Respiratory: Negative for cough and shortness of breath  Cardiovascular: Negative for chest pain and leg swelling  Gastrointestinal: Positive for abdominal pain and vomiting  Negative for diarrhea and nausea  Genitourinary: Negative for dysuria and flank pain  Musculoskeletal: Negative for back pain, neck pain and neck stiffness  Skin: Negative for pallor and rash  Allergic/Immunologic: Negative for environmental allergies and immunocompromised state  Neurological: Negative for dizziness and headaches  Hematological: Negative for adenopathy  Does not bruise/bleed easily  Psychiatric/Behavioral: Negative for agitation and behavioral problems  All other systems reviewed and are negative  Physical Exam  Physical Exam  Vitals signs and nursing note reviewed  Constitutional:       General: She is not in acute distress  Appearance: She is well-developed  HENT:      Head: Normocephalic and atraumatic  Neck:      Musculoskeletal: Normal range of motion and neck supple  Cardiovascular:      Rate and Rhythm: Normal rate and regular rhythm  Pulmonary:      Effort: Pulmonary effort is normal  No respiratory distress  Abdominal:      Palpations: Abdomen is soft  Tenderness: There is abdominal tenderness in the right upper quadrant  There is no guarding or rebound  Musculoskeletal: Normal range of motion  Skin:     General: Skin is warm and dry  Neurological:      Mental Status: She is alert and oriented to person, place, and time           Vital Signs  ED Triage Vitals [02/16/21 1630]   Temperature Pulse Respirations Blood Pressure SpO2   98 8 °F (37 1 °C) 105 18 (!) 175/85 99 %      Temp Source Heart Rate Source Patient Position - Orthostatic VS BP Location FiO2 (%)   Oral Monitor -- Right arm --      Pain Score       9           Vitals:    02/16/21 1630 02/16/21 1939   BP: (!) 175/85 120/67   Pulse: 105 89         Visual Acuity      ED Medications  Medications   sodium chloride 0 9 % bolus 1,000 mL (0 mL Intravenous Stopped 2/16/21 1935)   ondansetron (ZOFRAN) injection 4 mg (4 mg Intravenous Given 2/16/21 1706)   morphine (PF) 4 mg/mL injection 4 mg (4 mg Intravenous Given 2/16/21 1709)   acetaminophen (TYLENOL) tablet 975 mg (975 mg Oral Given 2/16/21 1933)   iodixanol (VISIPAQUE) 320 MG/ML injection 100 mL (100 mL Intravenous Given 2/16/21 2013)   famotidine (PEPCID) injection 20 mg (20 mg Intravenous Given 2/16/21 2039)   aluminum-magnesium hydroxide-simethicone (MYLANTA) oral suspension 30 mL (30 mL Oral Given 2/16/21 2039)   Lidocaine Viscous HCl (XYLOCAINE) 2 % mucosal solution 15 mL (15 mL Swish & Swallow Given 2/16/21 2039)       Diagnostic Studies  Results Reviewed     Procedure Component Value Units Date/Time    Comprehensive metabolic panel [[id number]]  (Abnormal) Collected: 02/16/21 1706    Lab Status: Final result Specimen: Blood from Arm, Right Updated: 02/16/21 1752     Sodium 137 mmol/L      Potassium 3 7 mmol/L      Chloride 101 mmol/L      CO2 28 mmol/L      ANION GAP 8 mmol/L      BUN 16 mg/dL      Creatinine 1 03 mg/dL      Glucose 111 mg/dL      Calcium 9 4 mg/dL      AST 20 U/L      ALT 30 U/L      Alkaline Phosphatase 71 U/L      Total Protein 8 5 g/dL      Albumin 3 9 g/dL      Total Bilirubin 0 40 mg/dL      eGFR 76 ml/min/1 73sq m     Narrative:      [place] guidelines for Chronic Kidney Disease (CKD):     Stage 1 with normal or high GFR (GFR > 90 mL/min/1 73 square meters)    Stage 2 Mild CKD (GFR = 60-89 mL/min/1 73 square meters)    Stage 3A Moderate CKD (GFR = 45-59 mL/min/1 73 square meters)    Stage 3B Moderate CKD (GFR = 30-44 mL/min/1 73 square meters)    Stage 4 Severe CKD (GFR = 15-29 mL/min/1 73 square meters)    Stage 5 End Stage CKD (GFR <15 mL/min/1 73 square meters)  Note: GFR calculation is accurate only with a steady state creatinine    Lipase [[id number]]  (Normal) Collected: 02/16/21 2151    Lab Status: Final result Specimen: Blood from Arm, Right Updated: 02/16/21 1752     Lipase 83 u/L     Urine Microscopic [[id number]]  (Abnormal) Collected: 02/16/21 1648    Lab Status: Final result Specimen: Urine, Clean Catch Updated: 02/16/21 1731     RBC, UA 2-4 /hpf      WBC, UA 0-1 /hpf      Epithelial Cells Occasional /hpf      Bacteria, UA Occasional /hpf     CBC and differential [[id number]]  (Abnormal) Collected: 02/16/21 1706    Lab Status: Final result Specimen: Blood from Arm, Right Updated: 02/16/21 1713     WBC 9 21 Thousand/uL      RBC 4 49 Million/uL      Hemoglobin 13 7 g/dL      Hematocrit 42 0 %      MCV 94 fL      MCH 30 5 pg      MCHC 32 6 g/dL      RDW 13 3 %      MPV 10 8 fL      Platelets 434 Thousands/uL      nRBC 0 /100 WBCs      Neutrophils Relative 89 %      Immat GRANS % 0 %      Lymphocytes Relative 7 %      Monocytes Relative 4 %      Eosinophils Relative 0 %      Basophils Relative 0 %      Neutrophils Absolute 8 17 Thousands/µL      Immature Grans Absolute 0 03 Thousand/uL      Lymphocytes Absolute 0 66 Thousands/µL      Monocytes Absolute 0 34 Thousand/µL      Eosinophils Absolute 0 00 Thousand/µL      Basophils Absolute 0 01 Thousands/µL     UA w Reflex to Microscopic w Reflex to Culture [[id number]]  (Abnormal) Collected: 02/16/21 1648    Lab Status: Final result Specimen: Urine, Clean Catch Updated: 02/16/21 1707     Color, UA Yellow     Clarity, UA Clear     Specific Gravity, UA 1 025     pH, UA 6 0     Leukocytes, UA Negative     Nitrite, UA Negative     Protein, UA Negative mg/dl      Glucose, UA Negative mg/dl      Ketones, UA Negative mg/dl      Urobilinogen, UA 0 2 E U /dl      Bilirubin, UA Negative     Blood, UA Trace-Intact    POCT pregnancy, urine [[id number]]  (Normal) Resulted: 02/16/21 1649    Lab Status: Final result Updated: 02/16/21 1649     EXT PREG TEST UR (Ref: Negative) Negative (-)     Control Valid                 CT abdomen pelvis with contrast   Final Result by Abbie Celis Kenzie Martinez MD (02/16 2029)   No acute findings with a normal gallbladder normal appendix identified  Workstation performed: [id number]         US gallbladder   Final Result by Grzegorz Harper DO (02/16 1923)   Pancreatic tail obscured by overlying bowel gas  Mild hepatosplenomegaly  No biliary ductal dilatation  Gallbladder polyps measuring up to 6 mm  According to current literature guidelines (JACR 2013;10:953-956) small polyps this size are benign and no workup or followup is needed  Unremarkable right kidney  Workstation performed: [id number]                    Procedures  Procedures         ED Course  ED Course as of Feb 16 2114   Tue Feb 16, 2021   1710 Leukocytes, UA: Negative   1710 Nitrite, UA: Negative   1710 Urine dip noted, preg negative  PREGNANCY TEST URINE: Negative (-)   1734 WBC: 9 21   1734 Hemoglobin: 13 7   1734 CBC noted  Platelet Count: 372   1755 Sodium: 137   1755 Potassium: 3 7   1755 BUN: 16   1755 Creatinine: 1 03   1755 AST: 20   1755 ALT: 30   1755 CMP, Lipase reviewed, no significant acute abnormality  Lipase: [address] does not show any acute abnormality in relation to gall bladder  1940 Patient re-evaluated, still has some right-sided abdominal pain, now describes a little to the right flank, will get CT scan abdomen pelvis  2034 CT scan does not show any acute abnormality  2034 We will treat with Pepcid, GI cocktail for possible gastritis, discharge with nonspecific upper abdominal pain, possible gastritis, treat with Pepcid, Bentyl, Zofran  SBIRT 22yo+      Most Recent Value   SBIRT (24 yo +)   In order to provide better care to our patients, we are screening all of our patients for alcohol and drug use  Would it be okay to ask you these screening questions?   No Filed at: 02/16/2021 1703                    MDM  Number of Diagnoses or Management Options  Abdominal pain: new and requires workup  Gastritis:   Diagnosis management comments: Patient is a 79-year-old female, comes in with complaints of right upper abdominal pain, vomiting since morning, denies fever, diarrhea, chest pain or dyspnea  On exam, patient appears uncomfortable due to pain, vital signs are noted, right upper quadrant tenderness noted, no right lower abdominal tenderness, negative Rovsing signs, negative psoas and obturator's signs  Impression:  Right upper quadrant pain, possible biliary colic, cholecystitis, will check labs, get ultrasound, IV fluids, pain meds, Zofran  Amount and/or Complexity of Data Reviewed  Clinical lab tests: reviewed and ordered  Tests in the radiology section of CPT®: ordered and reviewed  Tests in the medicine section of CPT®: ordered and reviewed  Independent visualization of images, tracings, or specimens: yes        Disposition  Final diagnoses:   Abdominal pain   Gastritis - possible     Time reflects when diagnosis was documented in both MDM as applicable and the Disposition within this note     Time User Action Codes Description Comment    2/16/2021  5:17 PM Wandalee Boor Add [R10 9] Abdominal pain     2/16/2021  8:35 PM Wandalee Boor Add [K29 70] Gastritis     2/16/2021  8:35 PM [place], [address] [K29 70] Gastritis possible      ED Disposition     ED Disposition Condition Date/Time Comment    Discharge Stable Tue Feb 16, 2021  8:35 PM [address] discharge to home/self care              Follow-up Information     Follow up With Specialties Details Why Contact Info    Valdo Ortiz DO Family Medicine Schedule an appointment as soon as possible for a visit   [address] [id number] [phone]            Discharge Medication List as of 2/16/2021  8:38 PM      START taking these medications    Details   dicyclomine (BENTYL) 20 mg tablet Take 1 tablet (20 mg total) by mouth 2 (two) times a day for 5 days, Starting Tue 2/16/2021, Until Sun 2/21/2021, Print      famotidine (PEPCID) 20 mg tablet Take 1 tablet (20 mg total) by mouth 2 (two) times a day for 5 days, Starting Tue 2/16/2021, Until Sun 2/21/2021, Print      ondansetron (ZOFRAN-ODT) 4 mg disintegrating tablet Take 1 tablet (4 mg total) by mouth every 8 (eight) hours as needed for nausea or vomiting for up to 5 days, Starting Tue 2/16/2021, Until Sun 2/21/2021, Print         CONTINUE these medications which have NOT CHANGED    Details   diphenhydrAMINE (BENADRYL) 25 mg tablet Take 1 tablet (25 mg total) by mouth every 6 (six) hours, Starting Wed 5/29/2019, Print           No discharge procedures on file      PDMP Review     None          ED Provider  Electronically Signed by           Teena Li MD  02/16/21 9329

## 2021-02-16 NOTE — Clinical Note
Lulu Luther was seen and treated in our emergency department on 2/16/2021  Diagnosis:     Yazan Mayorga  may return to work on return date  She may return on this date: 02/17/2021         If you have any questions or concerns, please don't hesitate to call        Sindhu Ramsey RN    ______________________________           _______________          _______________  Hospital Representative                              Date                                Time

## 2021-02-17 NOTE — ED NOTES
Patient transported to [facility] [address] Meredith Juan, [address] [place] [address]  02/16/21 2006

## 2022-07-10 ENCOUNTER — APPOINTMENT (EMERGENCY)
Dept: RADIOLOGY | Facility: HOSPITAL | Age: 27
End: 2022-07-10
Payer: COMMERCIAL

## 2022-07-10 ENCOUNTER — APPOINTMENT (EMERGENCY)
Dept: CT IMAGING | Facility: HOSPITAL | Age: 27
End: 2022-07-10
Payer: COMMERCIAL

## 2022-07-10 ENCOUNTER — HOSPITAL ENCOUNTER (EMERGENCY)
Facility: HOSPITAL | Age: 27
Discharge: HOME | End: 2022-07-10
Attending: EMERGENCY MEDICINE
Payer: COMMERCIAL

## 2022-07-10 VITALS
OXYGEN SATURATION: 99 % | TEMPERATURE: 98.1 F | HEART RATE: 77 BPM | RESPIRATION RATE: 18 BRPM | SYSTOLIC BLOOD PRESSURE: 144 MMHG | DIASTOLIC BLOOD PRESSURE: 97 MMHG
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
## 2022-07-10 DIAGNOSIS — S39.012A STRAIN OF LUMBAR REGION, INITIAL ENCOUNTER: ICD-10-CM

## 2022-07-10 DIAGNOSIS — S09.90XA INJURY OF HEAD, INITIAL ENCOUNTER: ICD-10-CM

## 2022-07-10 DIAGNOSIS — S16.1XXA STRAIN OF NECK MUSCLE, INITIAL ENCOUNTER: ICD-10-CM

## 2022-07-10 LAB
BACTERIA UR QL AUTO: ABNORMAL /HPF
BILIRUB UR QL STRIP: NEGATIVE
CLARITY UR: CLEAR
COLOR UR: YELLOW
EXT PREG TEST URINE: NEGATIVE
EXT. CONTROL ED NAV: NORMAL
GLUCOSE UR STRIP-MCNC: NEGATIVE MG/DL
HGB UR QL STRIP.AUTO: ABNORMAL
KETONES UR STRIP-MCNC: NEGATIVE MG/DL
LEUKOCYTE ESTERASE UR QL STRIP: NEGATIVE
NITRITE UR QL STRIP: NEGATIVE
NON-SQ EPI CELLS URNS QL MICRO: ABNORMAL /HPF
PH UR STRIP.AUTO: 7 [PH] (ref 4.5–8)
PROT UR STRIP-MCNC: NEGATIVE MG/DL
RBC #/AREA URNS AUTO: ABNORMAL /HPF
SP GR UR STRIP.AUTO: 1.02 (ref 1–1.03)
UROBILINOGEN UR QL STRIP.AUTO: 0.2 E.U./DL
WBC #/AREA URNS AUTO: ABNORMAL /HPF

## 2022-07-10 PROCEDURE — 81001 URINALYSIS AUTO W/SCOPE: CPT

## 2022-07-10 PROCEDURE — 99284 EMERGENCY DEPT VISIT MOD MDM: CPT

## 2022-07-10 PROCEDURE — 70450 CT HEAD/BRAIN W/O DYE: CPT

## 2022-07-10 PROCEDURE — 72125 CT NECK SPINE W/O DYE: CPT

## 2022-07-10 PROCEDURE — G1004 CDSM NDSC: HCPCS

## 2022-07-10 PROCEDURE — 81025 URINE PREGNANCY TEST: CPT | Performed by: EMERGENCY MEDICINE

## 2022-07-10 PROCEDURE — 99284 EMERGENCY DEPT VISIT MOD MDM: CPT | Performed by: EMERGENCY MEDICINE

## 2022-07-10 PROCEDURE — 72100 X-RAY EXAM L-S SPINE 2/3 VWS: CPT

## 2022-07-10 RX ORDER — NAPROXEN 500 MG/1
500 TABLET ORAL 2 TIMES DAILY PRN
Qty: 20 TABLET | Refills: 0 | Status: SHIPPED | OUTPATIENT
Start: 2022-07-10

## 2022-07-10 RX ORDER — CYCLOBENZAPRINE HCL 10 MG
10 TABLET ORAL 2 TIMES DAILY PRN
Qty: 20 TABLET | Refills: 0 | Status: SHIPPED | OUTPATIENT
Start: 2022-07-10
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
## 2022-07-10 NOTE — Clinical Note
Dom Mendez was seen and treated in our emergency department on 7/10/2022  Diagnosis:     Kimber Duenas  may return to work on return date  She may return on this date: 07/15/2022    No heavy lifting (nothing over 10 pounds )for one week     If you have any questions or concerns, please don't hesitate to call        Parveen Oliva MD    ______________________________           _______________          _______________  Hospital Representative                              Date                                Time

## 2022-10-16 ENCOUNTER — HOSPITAL ENCOUNTER (EMERGENCY)
Facility: HOSPITAL | Age: 27
Discharge: HOME | End: 2022-10-16
Attending: EMERGENCY MEDICINE

## 2022-10-16 VITALS
BODY MASS INDEX: 33.53 KG/M2 | RESPIRATION RATE: 16 BRPM | WEIGHT: 195.33 LBS | DIASTOLIC BLOOD PRESSURE: 59 MMHG | OXYGEN SATURATION: 98 % | TEMPERATURE: 99.5 F | HEART RATE: 82 BPM | SYSTOLIC BLOOD PRESSURE: 125 MMHG

## 2022-10-16 DIAGNOSIS — R50.9 FEVER: Primary | ICD-10-CM

## 2022-10-16 DIAGNOSIS — U07.1 LAB TEST POSITIVE FOR DETECTION OF COVID-19 VIRUS: ICD-10-CM

## 2022-10-16 LAB
BACTERIA UR QL AUTO: ABNORMAL /HPF
BILIRUB UR QL STRIP: NEGATIVE
CLARITY UR: ABNORMAL
COLOR UR: YELLOW
EXT PREG TEST URINE: NEGATIVE
EXT. CONTROL ED NAV: NORMAL
FLUAV RNA RESP QL NAA+PROBE: NEGATIVE
FLUBV RNA RESP QL NAA+PROBE: NEGATIVE
GLUCOSE SERPL-MCNC: 87 MG/DL (ref 65–140)
GLUCOSE UR STRIP-MCNC: NEGATIVE MG/DL
HGB UR QL STRIP.AUTO: ABNORMAL
KETONES UR STRIP-MCNC: NEGATIVE MG/DL
LEUKOCYTE ESTERASE UR QL STRIP: NEGATIVE
MUCOUS THREADS UR QL AUTO: ABNORMAL
NITRITE UR QL STRIP: NEGATIVE
NON-SQ EPI CELLS URNS QL MICRO: ABNORMAL /HPF
PH UR STRIP.AUTO: 6 [PH]
PROT UR STRIP-MCNC: NEGATIVE MG/DL
RBC #/AREA URNS AUTO: ABNORMAL /HPF
RSV RNA RESP QL NAA+PROBE: NEGATIVE
S PYO DNA THROAT QL NAA+PROBE: NOT DETECTED
SARS-COV-2 RNA RESP QL NAA+PROBE: POSITIVE
SP GR UR STRIP.AUTO: 1.01 (ref 1–1.03)
UROBILINOGEN UR QL STRIP.AUTO: 0.2 E.U./DL
WBC #/AREA URNS AUTO: ABNORMAL /HPF

## 2022-10-16 PROCEDURE — 81025 URINE PREGNANCY TEST: CPT | Performed by: EMERGENCY MEDICINE

## 2022-10-16 PROCEDURE — 87086 URINE CULTURE/COLONY COUNT: CPT | Performed by: EMERGENCY MEDICINE

## 2022-10-16 PROCEDURE — 87651 STREP A DNA AMP PROBE: CPT | Performed by: EMERGENCY MEDICINE

## 2022-10-16 PROCEDURE — 99283 EMERGENCY DEPT VISIT LOW MDM: CPT

## 2022-10-16 PROCEDURE — 82948 REAGENT STRIP/BLOOD GLUCOSE: CPT

## 2022-10-16 PROCEDURE — 99284 EMERGENCY DEPT VISIT MOD MDM: CPT | Performed by: EMERGENCY MEDICINE

## 2022-10-16 PROCEDURE — 81001 URINALYSIS AUTO W/SCOPE: CPT | Performed by: EMERGENCY MEDICINE

## 2022-10-16 PROCEDURE — 0241U HB NFCT DS VIR RESP RNA 4 TRGT: CPT | Performed by: EMERGENCY MEDICINE

## 2022-10-16 PROCEDURE — 96372 THER/PROPH/DIAG INJ SC/IM: CPT

## 2022-10-16 RX ORDER — ACETAMINOPHEN 325 MG/1
650 TABLET ORAL ONCE
Status: COMPLETED | OUTPATIENT
Start: 2022-10-16 | End: 2022-10-16

## 2022-10-16 RX ORDER — KETOROLAC TROMETHAMINE 30 MG/ML
30 INJECTION, SOLUTION INTRAMUSCULAR; INTRAVENOUS ONCE
Status: COMPLETED | OUTPATIENT
Start: 2022-10-16 | End: 2022-10-16

## 2022-10-16 RX ORDER — ONDANSETRON 4 MG/1
4 TABLET, ORALLY DISINTEGRATING ORAL ONCE
Status: COMPLETED | OUTPATIENT
Start: 2022-10-16 | End: 2022-10-16

## 2022-10-16 RX ADMIN — ACETAMINOPHEN 650 MG: 325 TABLET, FILM COATED ORAL at 18:16

## 2022-10-16 RX ADMIN — KETOROLAC TROMETHAMINE 30 MG: 30 INJECTION, SOLUTION INTRAMUSCULAR; INTRAVENOUS at 18:16

## 2022-10-16 RX ADMIN — ONDANSETRON 4 MG: 4 TABLET, ORALLY DISINTEGRATING ORAL at 18:16

## 2022-10-16 NOTE — Clinical Note
Hermilo Rocha was seen and treated in our emergency department on 10/16/2022  Diagnosis:     Jonnya    She may return on this date: 10/21/2022         If you have any questions or concerns, please don't hesitate to call        Macho Dumont DO    ______________________________           _______________          _______________  Hospital Representative                              Date                                Time

## 2022-10-16 NOTE — ED PROVIDER NOTES
History  Chief Complaint   Patient presents with   • Fever - 9 weeks to 74 years     Fevers with associated body aches and vomiting  Patient is a 13-year-old female primarily [place]yan-speaking only coming in today with fevers and overall not feeling well  I did use  services, [employer]  number - [id number] -patient started with fevers today  She states that she was feeling well yesterday  She has a mom who has similar symptoms but did not go see physician or get checked  She states that she took her temperature orally of 100 5  She tempted to take omeprazole and Tylenol but threw it back up  She has diffuse body aches, headaches and started with a sore throat was subsequently resolved  She has no cough or urinary symptoms  She vomited 5 times with non bilious, non coffee-ground, no hematemesis  No diarrhea or abdominal pain  She denies any ear pain  No cough      History provided by:  Patient   used: No    Fever - 9 weeks to 74 years  Max temp prior to arrival:  100 5  Temp source:  Oral  Severity:  Mild  Onset quality:  Gradual  Timing:  Intermittent  Progression:  Waxing and waning  Chronicity:  New  Relieved by:  Nothing  Worsened by:  Nothing  Ineffective treatments:  Acetaminophen  Associated symptoms: chills, headaches, nausea, sore throat and vomiting    Associated symptoms: no chest pain, no confusion, no congestion, no cough, no diarrhea, no dysuria, no ear pain, no rash, no rhinorrhea and no somnolence    Risk factors: sick contacts    Risk factors: no contaminated food, no contaminated water, no hx of cancer, no immunosuppression, no occupational exposure, no recent sickness and no recent travel        Prior to Admission Medications   Prescriptions Last Dose Informant Patient Reported? Taking?    cyclobenzaprine (FLEXERIL) 10 mg tablet Not Taking at Unknown time  No No   Sig: Take 1 tablet (10 mg total) by mouth 2 (two) times a day as needed for muscle spasms Patient not taking: Reported on 10/16/2022   dicyclomine (BENTYL) 20 mg tablet   No No   Sig: Take 1 tablet (20 mg total) by mouth 2 (two) times a day for 5 days   diphenhydrAMINE (BENADRYL) 25 mg tablet Not Taking at Unknown time  No No   Sig: Take 1 tablet (25 mg total) by mouth every 6 (six) hours   Patient not taking: No sig reported   famotidine (PEPCID) 20 mg tablet   No No   Sig: Take 1 tablet (20 mg total) by mouth 2 (two) times a day for 5 days   naproxen (NAPROSYN) 500 mg tablet Not Taking at Unknown time  No No   Sig: Take 1 tablet (500 mg total) by mouth 2 (two) times a day as needed for mild pain   Patient not taking: Reported on 10/16/2022   ondansetron (ZOFRAN-ODT) 4 mg disintegrating tablet   No No   Sig: Take 1 tablet (4 mg total) by mouth every 8 (eight) hours as needed for nausea or vomiting for up to 5 days      Facility-Administered Medications: None       Past Medical History:   Diagnosis Date   • Prediabetes        History reviewed  No pertinent surgical history  History reviewed  No pertinent family history  I have reviewed and agree with the history as documented  E-Cigarette/Vaping     E-Cigarette/Vaping Substances     Social History     Tobacco Use   • Smoking status: Never Smoker   • Smokeless tobacco: Never Used   Substance Use Topics   • Alcohol use: Not Currently   • Drug use: Never       Review of Systems   Constitutional: Positive for chills and fever  HENT: Positive for sore throat  Negative for congestion, ear pain and rhinorrhea  Eyes: Negative  Negative for pain and visual disturbance  Respiratory: Negative  Negative for cough and shortness of breath  Cardiovascular: Negative  Negative for chest pain and palpitations  Gastrointestinal: Positive for nausea and vomiting  Negative for abdominal pain and diarrhea  Genitourinary: Negative  Negative for dysuria and hematuria  Musculoskeletal: Negative  Negative for arthralgias and back pain     Skin: Negative  Negative for color change and rash  Neurological: Positive for headaches  Negative for seizures and syncope  Hematological: Negative  Psychiatric/Behavioral: Negative  Negative for confusion  All other systems reviewed and are negative  Physical Exam  Physical Exam  Vitals and nursing note reviewed  Constitutional:       General: She is not in acute distress  Appearance: She is well-developed  HENT:      Head: Normocephalic and atraumatic  Comments: Patient maintaining airway and secretions  No stridor   No brawniness under tongue  Patient with posterior pharyngeal erythema without any exudate  Uvula midline without edema     Right Ear: Tympanic membrane, ear canal and external ear normal       Left Ear: Tympanic membrane, ear canal and external ear normal       Nose: Nose normal       Mouth/Throat:      Mouth: Mucous membranes are dry  Eyes:      Extraocular Movements: Extraocular movements intact  Conjunctiva/sclera: Conjunctivae normal       Pupils: Pupils are equal, round, and reactive to light  Cardiovascular:      Rate and Rhythm: Normal rate and regular rhythm  Pulses:           Radial pulses are 2+ on the right side and 2+ on the left side  Heart sounds: Normal heart sounds, S1 normal and S2 normal  No murmur heard  Pulmonary:      Effort: Pulmonary effort is normal  No respiratory distress  Breath sounds: Normal breath sounds  Abdominal:      Palpations: Abdomen is soft  Tenderness: There is no abdominal tenderness  Musculoskeletal:         General: Normal range of motion  Cervical back: Normal range of motion and neck supple  Right lower leg: No edema  Left lower leg: No edema  Lymphadenopathy:      Cervical: Cervical adenopathy present  Right cervical: No superficial or deep cervical adenopathy  Left cervical: No superficial or deep cervical adenopathy  Skin:     General: Skin is warm and dry  Capillary Refill: Capillary refill takes less than 2 seconds  Neurological:      General: No focal deficit present  Mental Status: She is alert and oriented to person, place, and time  Comments: No slurred speech  No facial asymmetry  No tongue deviation, patient moves bilateral upper extremities and lower extremities independently of each other pain-free   Psychiatric:         Mood and Affect: Mood normal          Behavior: Behavior normal          Thought Content:  Thought content normal          Judgment: Judgment normal          Vital Signs  ED Triage Vitals   Temperature Pulse Respirations Blood Pressure SpO2   10/16/22 1737 10/16/22 1737 10/16/22 1737 10/16/22 1737 10/16/22 1737   99 5 °F (37 5 °C) (!) 113 16 129/71 99 %      Temp Source Heart Rate Source Patient Position - Orthostatic VS BP Location FiO2 (%)   10/16/22 1737 10/16/22 1737 10/16/22 1737 10/16/22 1737 --   Oral Monitor Sitting Right arm       Pain Score       10/16/22 1816       8           Vitals:    10/16/22 1737   BP: 129/71   Pulse: (!) 113   Patient Position - Orthostatic VS: Sitting         Visual Acuity      ED Medications  Medications   ondansetron (ZOFRAN-ODT) dispersible tablet 4 mg (4 mg Oral Given 10/16/22 1816)   ketorolac (TORADOL) injection 30 mg (30 mg Intramuscular Given 10/16/22 1816)   acetaminophen (TYLENOL) tablet 650 mg (650 mg Oral Given 10/16/22 1816)       Diagnostic Studies  Results Reviewed     Procedure Component Value Units Date/Time    Urine culture [[id number]]     Lab Status: No result Specimen: Urine, Clean Catch     FLU/RSV/COVID - if FLU/RSV clinically relevant [[id number]]  (Abnormal) Collected: 10/16/22 1820    Lab Status: Final result Specimen: Nares from Nose Updated: 10/16/22 1907     SARS-CoV-2 Positive     INFLUENZA A PCR Negative     INFLUENZA B PCR Negative     RSV PCR Negative    Narrative:      FOR PEDIATRIC PATIENTS - copy/paste COVID Guidelines URL to browser: [email or web address]  [email or web address]    SARS-CoV-2 assay is a Nucleic Acid Amplification assay intended for the  qualitative detection of nucleic acid from SARS-CoV-2 in nasopharyngeal  swabs  Results are for the presumptive identification of SARS-CoV-2 RNA  Positive results are indicative of infection with SARS-CoV-2, the virus  causing COVID-19, but do not rule out bacterial infection or co-infection  with other viruses  Laboratories within the [place] and its  territories are required to report all positive results to the appropriate  public health authorities  Negative results do not preclude SARS-CoV-2  infection and should not be used as the sole basis for treatment or other  patient management decisions  Negative results must be combined with  clinical observations, patient history, and epidemiological information  This test has not been FDA cleared or approved  This test has been authorized by FDA under an Emergency Use Authorization  (EUA)  This test is only authorized for the duration of time the  declaration that circumstances exist justifying the authorization of the  emergency use of an in vitro diagnostic tests for detection of SARS-CoV-2  virus and/or diagnosis of COVID-19 infection under section 564(b)(1) of  the Act, 21 U  S C  764USQ-0(Z)(6), unless the authorization is terminated  or revoked sooner  The test has been validated but independent review by FDA  and CLIA is pending  Test performed using [facility] GeneXpert: This RT-PCR assay targets N2,  a region unique to SARS-CoV-2  A conserved region in the E-gene was chosen  for pan-Sarbecovirus detection which includes SARS-CoV-2  According to CMS-2020-01-R, this platform meets the definition of high-throughput technology      Urine Microscopic [[id number]]  (Abnormal) Collected: 10/16/22 1843    Lab Status: Final result Specimen: Urine, Clean Catch Updated: 10/16/22 1902     RBC, UA 4-10 /hpf WBC, UA 1-2 /hpf      Epithelial Cells Moderate /hpf      Bacteria, UA Innumerable /hpf      MUCUS THREADS Occasional    Strep A PCR [[id number]]  (Normal) Collected: 10/16/22 1820    Lab Status: Final result Specimen: Throat Updated: 10/16/22 1853     STREP A PCR Not Detected    UA (URINE) with reflex to Scope [[id number]]  (Abnormal) Collected: 10/16/22 1843    Lab Status: Final result Specimen: Urine, Clean Catch Updated: 10/16/22 1852     Color, UA Yellow     Clarity, UA Slightly Cloudy     Specific Gravity, UA 1 015     pH, UA 6 0     Leukocytes, UA Negative     Nitrite, UA Negative     Protein, UA Negative mg/dl      Glucose, UA Negative mg/dl      Ketones, UA Negative mg/dl      Urobilinogen, UA 0 2 E U /dl      Bilirubin, UA Negative     Occult Blood, UA Moderate    Fingerstick Glucose (POCT) [[id number]]  (Normal) Collected: 10/16/22 1837    Lab Status: Final result Updated: 10/16/22 1838     POC Glucose 87 mg/dl     POCT pregnancy, urine [[id number]]  (Normal) Resulted: 10/16/22 1815    Lab Status: Final result Updated: 10/16/22 1815     EXT PREG TEST UR (Ref: Negative) negative     Control valid                 No orders to display              Procedures  Procedures         ED Course  ED Course as of 10/16/22 1916   Sun Oct 16, 2022   1801 Patient is a 33year old female coming in with 5-6 episodes of vomiting and overall not feeling well  On exam, patient is well appearing in no acute distress    With help of , will check COVID/flu/RSV strep pregnancy give Tylenol and Toradol as well as Zofran for symptomatic control      Disclosure: Voice to text software was used in the preparation of this document and could have resulted in translational errors       Occasional wrong word or "sound a like" substitutions may have occurred due to the inherent limitations of voice recognition software   Read the chart carefully and recognize, using context, where substitutions have occurred         1856 Moderate blood in urine  Strep is negative  Pregnancy negative  Glucose 87  Pending COVID flu   1909 COVID + - urine with bacterial however there is also noted epithelials  She has no urinary symptoms which I asked on initial exam   Will DC home and send urine culture  Alternate Tylenol Motrin                               SBIRT 20yo+    Flowsheet Row Most Recent Value   SBIRT (23 yo +)    In order to provide better care to our patients, we are screening all of our patients for alcohol and drug use  Would it be okay to ask you these screening questions? No Filed at: 10/16/2022 1809                    MDM    Disposition  Final diagnoses:   Fever   Lab test positive for detection of COVID-19 virus     Time reflects when diagnosis was documented in both MDM as applicable and the Disposition within this note     Time User Action Codes Description Comment    10/16/2022  6:57 PM Nichelle Ryder Add [R50 9] Fever     10/16/2022  7:10 PM Leidy Ryder Add [U07 1] Lab test positive for detection of COVID-19 virus       ED Disposition     ED Disposition   Discharge    Condition   Stable    Date/Time   Sun Oct 16, 2022  7:10 PM    Comment   Florinda Bai 47 discharge to home/self care  Follow-up Information     Follow up With Specialties Details Why Contact Info    Ijeoma Guillen DO Family Medicine Schedule an appointment as soon as possible for a visit in 3 days  [address] [place] [id number] [phone]            Patient's Medications   Discharge Prescriptions    No medications on file       No discharge procedures on file      PDMP Review     None          ED Provider  Electronically Signed by           Crescencio Osullivan DO  10/16/22 5746

## 2022-10-19 LAB — BACTERIA UR CULT: NORMAL

## 2023-04-22 ENCOUNTER — HOSPITAL ENCOUNTER (EMERGENCY)
Facility: HOSPITAL | Age: 28
Discharge: HOME | End: 2023-04-22
Attending: EMERGENCY MEDICINE | Admitting: EMERGENCY MEDICINE

## 2023-04-22 VITALS
RESPIRATION RATE: 18 BRPM | TEMPERATURE: 97.7 F | WEIGHT: 195.11 LBS | SYSTOLIC BLOOD PRESSURE: 116 MMHG | OXYGEN SATURATION: 99 % | HEART RATE: 67 BPM | BODY MASS INDEX: 33.49 KG/M2 | DIASTOLIC BLOOD PRESSURE: 75 MMHG

## 2023-04-22 DIAGNOSIS — R11.2 NAUSEA & VOMITING: ICD-10-CM

## 2023-04-22 DIAGNOSIS — R10.13 EPIGASTRIC PAIN: Primary | ICD-10-CM

## 2023-04-22 LAB
ALBUMIN SERPL BCP-MCNC: 4.4 G/DL (ref 3.5–5)
ALP SERPL-CCNC: 63 U/L (ref 34–104)
ALT SERPL W P-5'-P-CCNC: 16 U/L (ref 7–52)
ANION GAP SERPL CALCULATED.3IONS-SCNC: 8 MMOL/L (ref 4–13)
AST SERPL W P-5'-P-CCNC: 15 U/L (ref 13–39)
BACTERIA UR QL AUTO: ABNORMAL /HPF
BILIRUB SERPL-MCNC: 0.33 MG/DL (ref 0.2–1)
BILIRUB UR QL STRIP: NEGATIVE
BUN SERPL-MCNC: 10 MG/DL (ref 5–25)
CALCIUM SERPL-MCNC: 9.3 MG/DL (ref 8.4–10.2)
CHLORIDE SERPL-SCNC: 106 MMOL/L (ref 96–108)
CLARITY UR: CLEAR
CO2 SERPL-SCNC: 25 MMOL/L (ref 21–32)
COLOR UR: YELLOW
CREAT SERPL-MCNC: 0.83 MG/DL (ref 0.6–1.3)
EXT PREGNANCY TEST URINE: NEGATIVE
EXT. CONTROL: NORMAL
GFR SERPL CREATININE-BSD FRML MDRD: 97 ML/MIN/1.73SQ M
GLUCOSE SERPL-MCNC: 100 MG/DL (ref 65–140)
GLUCOSE UR STRIP-MCNC: NEGATIVE MG/DL
HGB UR QL STRIP.AUTO: ABNORMAL
KETONES UR STRIP-MCNC: NEGATIVE MG/DL
LEUKOCYTE ESTERASE UR QL STRIP: NEGATIVE
LIPASE SERPL-CCNC: 12 U/L (ref 11–82)
MUCOUS THREADS UR QL AUTO: ABNORMAL
NITRITE UR QL STRIP: NEGATIVE
NON-SQ EPI CELLS URNS QL MICRO: ABNORMAL /HPF
PH UR STRIP.AUTO: 7 [PH] (ref 4.5–8)
POTASSIUM SERPL-SCNC: 3.9 MMOL/L (ref 3.5–5.3)
PROT SERPL-MCNC: 7.7 G/DL (ref 6.4–8.4)
PROT UR STRIP-MCNC: ABNORMAL MG/DL
RBC #/AREA URNS AUTO: ABNORMAL /HPF
SODIUM SERPL-SCNC: 139 MMOL/L (ref 135–147)
SP GR UR STRIP.AUTO: 1.02 (ref 1–1.03)
UROBILINOGEN UR QL STRIP.AUTO: 1 E.U./DL
WBC #/AREA URNS AUTO: ABNORMAL /HPF

## 2023-04-22 RX ORDER — FAMOTIDINE 20 MG/1
20 TABLET, FILM COATED ORAL 2 TIMES DAILY
Qty: 28 TABLET | Refills: 0 | Status: SHIPPED | OUTPATIENT
Start: 2023-04-22

## 2023-04-22 RX ORDER — ONDANSETRON 2 MG/ML
4 INJECTION INTRAMUSCULAR; INTRAVENOUS ONCE
Status: COMPLETED | OUTPATIENT
Start: 2023-04-22 | End: 2023-04-22

## 2023-04-22 RX ORDER — KETOROLAC TROMETHAMINE 30 MG/ML
15 INJECTION, SOLUTION INTRAMUSCULAR; INTRAVENOUS ONCE
Status: COMPLETED | OUTPATIENT
Start: 2023-04-22 | End: 2023-04-22

## 2023-04-22 RX ORDER — FAMOTIDINE 10 MG/ML
20 INJECTION, SOLUTION INTRAVENOUS ONCE
Status: COMPLETED | OUTPATIENT
Start: 2023-04-22 | End: 2023-04-22

## 2023-04-22 RX ORDER — ONDANSETRON 4 MG/1
4 TABLET, ORALLY DISINTEGRATING ORAL EVERY 6 HOURS PRN
Qty: 8 TABLET | Refills: 0 | Status: SHIPPED | OUTPATIENT
Start: 2023-04-22

## 2023-04-22 RX ADMIN — FAMOTIDINE 20 MG: 10 INJECTION INTRAVENOUS at 09:48

## 2023-04-22 RX ADMIN — DEXTROSE, SODIUM CHLORIDE, SODIUM LACTATE, POTASSIUM CHLORIDE, AND CALCIUM CHLORIDE 1000 ML: 5; .6; .31; .03; .02 INJECTION, SOLUTION INTRAVENOUS at 09:48

## 2023-04-22 RX ADMIN — KETOROLAC TROMETHAMINE 15 MG: 30 INJECTION, SOLUTION INTRAMUSCULAR; INTRAVENOUS at 09:48

## 2023-04-22 RX ADMIN — ONDANSETRON HYDROCHLORIDE 4 MG: 2 SOLUTION INTRAMUSCULAR; INTRAVENOUS at 09:48

## 2023-04-22 NOTE — DISCHARGE INSTRUCTIONS
Gastritis  LO QUE NECESITAS SABER:  La gastritis es veronica inflamación o irritación del revestimiento del estómago  INSTRUCCIONES DE DESCARGA:  Llame al 911 por cualquiera de los siguientes:  Usted desarrolla dolor en el pecho o dificultad para respirar  Regrese al departamento de emergencia si:  Usted vomita jenaro  Tienes evacuaciones negras o sangrientas  Usted tiene dolor de estómago o de espalda severo  Comuníquese con seymour proveedor de atención médica si:  Tienes fiebre  Tiene síntomas nuevos o que [place], incluso después del [place]  Usted tiene preguntas o inquietudes sobre seymour condición o cuidado  Medicamentos:    Erskine seymour medicamento según las indicaciones  Administre o prevenga la gastritis:  No fume  La nicotina y otros químicos en cigarros y puros pueden empeorar ernie síntomas y causar daño a los pulmones  Consulte a seymour proveedor de atención médica para obtener información si actualmente fuma y necesita ayuda para dejar de fumar  Los cigarrillos electrónicos o el tabaco sin humo todavía contienen nicotina  Hable con seymour proveedor de [employer] antes de usar estos productos  No tomes alcohol  El alcohol puede prevenir la curación y empeorar seymour gastritis  Hable con seymour proveedor de atención médica si necesita ayuda para dejar de beber  No tome ILAN ni aspirina a menos que se lo indiquen  Estos y medicamentos similares pueden causar irritación  Si seymour proveedor de [employer] que está [place] niles NSAID, tómelos con alimentos  No consuma alimentos que causen irritación  [address] naranjas y la salsa pueden causar ardor o dolor  Coma veronica variedad de alimentos saludables  Los ejemplos incluyen frutas (no cítricos), verduras, productos lácteos bajos en grasa, frijoles, panes integrales y praveena magras y pescado  Intente comer comidas pequeñas y tome agua con ernie comidas  No coma por lo menos 3 horas antes de acostarse     Gastritis   WHAT YOU NEED TO KNOW: Gastritis is inflammation or irritation of the lining of your stomach  DISCHARGE INSTRUCTIONS:   Call 911 for any of the following: You develop chest pain or shortness of breath  Return to the emergency department if:   You vomit blood  You have black or bloody bowel movements  You have severe stomach or back pain  Contact your healthcare provider if:   You have a fever  You have new or worsening symptoms, even after treatment  You have questions or concerns about your condition or care  Medicines:     Take your medicine as directed  Manage or prevent gastritis:   Do not smoke  Nicotine and other chemicals in cigarettes and cigars can make your symptoms worse and cause lung damage  Ask your healthcare provider for information if you currently smoke and need help to quit  E-cigarettes or smokeless tobacco still contain nicotine  Talk to your healthcare provider before you use these products  Do not drink alcohol  Alcohol can prevent healing and make your gastritis worse  Talk to your healthcare provider if you need help to stop drinking  Do not take NSAIDs or aspirin unless directed  These and similar medicines can cause irritation  If your healthcare provider says it is okay to take NSAIDs, take them with food  Do not eat foods that cause irritation  Foods such as oranges and salsa can cause burning or pain  Eat a variety of healthy foods  Examples include fruits (not citrus), vegetables, low-fat dairy products, beans, whole-grain breads, and lean meats and fish  Try to eat small meals, and drink water with your meals  Do not eat for at least 3 hours before you go to bed

## 2023-04-22 NOTE — ED PROVIDER NOTES
History  Chief Complaint   Patient presents with   • Vomiting     States has had 7 episodes of vomiting since 0500 this morning  Also complaining of epigastric pain  Denies diarrhea or fever  32 yo F c/o onset of epigastric pain and vomiting, waking her from sleep about 5:30am, up to 7 times since onset, no diarrhea, no fever  She was well last night, although she says she did not eat before bed  She denies recurrent h/o similar symptoms  Currently experiencing menses for 2 days  She denies smoking, alcohol  History provided by:  Patient   used: Yes ([facility] [id number])        Prior to Admission Medications   Prescriptions Last Dose Informant Patient Reported? Taking? cyclobenzaprine (FLEXERIL) 10 mg tablet   No No   Sig: Take 1 tablet (10 mg total) by mouth 2 (two) times a day as needed for muscle spasms   Patient not taking: Reported on 10/16/2022   dicyclomine (BENTYL) 20 mg tablet   No No   Sig: Take 1 tablet (20 mg total) by mouth 2 (two) times a day for 5 days   diphenhydrAMINE (BENADRYL) 25 mg tablet   No No   Sig: Take 1 tablet (25 mg total) by mouth every 6 (six) hours   Patient not taking: No sig reported   famotidine (PEPCID) 20 mg tablet   No No   Sig: Take 1 tablet (20 mg total) by mouth 2 (two) times a day for 5 days   naproxen (NAPROSYN) 500 mg tablet   No No   Sig: Take 1 tablet (500 mg total) by mouth 2 (two) times a day as needed for mild pain   Patient not taking: Reported on 10/16/2022   ondansetron (ZOFRAN-ODT) 4 mg disintegrating tablet   No No   Sig: Take 1 tablet (4 mg total) by mouth every 8 (eight) hours as needed for nausea or vomiting for up to 5 days      Facility-Administered Medications: None       Past Medical History:   Diagnosis Date   • Prediabetes        Past Surgical History:   Procedure Laterality Date   • KNEE CARTILAGE SURGERY Left 03/21/2023       History reviewed  No pertinent family history    I have reviewed and agree with the history as documented  E-Cigarette/Vaping     E-Cigarette/Vaping Substances     Social History     Tobacco Use   • Smoking status: Never   • Smokeless tobacco: Never   Substance Use Topics   • Alcohol use: Not Currently   • Drug use: Never       Review of Systems    Physical Exam  Physical Exam  Vitals and nursing note reviewed  Constitutional:       General: She is not in acute distress  Appearance: She is well-developed  She is not diaphoretic  HENT:      Head: Normocephalic and atraumatic  Right Ear: External ear normal       Left Ear: External ear normal       Nose: Nose normal    Eyes:      Conjunctiva/sclera: Conjunctivae normal       Pupils: Pupils are equal, round, and reactive to light  Cardiovascular:      Rate and Rhythm: Normal rate and regular rhythm  Pulmonary:      Effort: Pulmonary effort is normal    Abdominal:      Palpations: Abdomen is soft  Tenderness: There is abdominal tenderness in the epigastric area  Negative signs include Mcdowell's sign and McBurney's sign  Musculoskeletal:         General: Normal range of motion  Cervical back: Normal range of motion and neck supple  Skin:     General: Skin is warm and dry  Findings: No rash  Neurological:      Mental Status: She is alert and oriented to person, place, and time  Gait: Gait normal    Psychiatric:         Behavior: Behavior normal          Thought Content:  Thought content normal          Judgment: Judgment normal          Vital Signs  ED Triage Vitals [04/22/23 0853]   Temperature Pulse Respirations Blood Pressure SpO2   97 7 °F (36 5 °C) 83 18 124/86 99 %      Temp Source Heart Rate Source Patient Position - Orthostatic VS BP Location FiO2 (%)   Oral Monitor Sitting Right arm --      Pain Score       9           Vitals:    04/22/23 0853 04/22/23 1049   BP: 124/86 116/75   Pulse: 83 67   Patient Position - Orthostatic VS: Sitting Lying         Visual Acuity      ED Medications  Medications   dextrose 5% lactated Ringer's bolus 1,000 mL (1,000 mL Intravenous New Bag 4/22/23 0948)   Famotidine (PF) (PEPCID) injection 20 mg (20 mg Intravenous Given 4/22/23 0948)   ondansetron (ZOFRAN) injection 4 mg (4 mg Intravenous Given 4/22/23 0948)   ketorolac (TORADOL) injection 15 mg (15 mg Intravenous Given 4/22/23 0948)       Diagnostic Studies  Results Reviewed     Procedure Component Value Units Date/Time    Comprehensive metabolic panel [[id number]] Collected: 04/22/23 0945    Lab Status: Final result Specimen: Blood from Arm, Left Updated: 04/22/23 1020     Sodium 139 mmol/L      Potassium 3 9 mmol/L      Chloride 106 mmol/L      CO2 25 mmol/L      ANION GAP 8 mmol/L      BUN 10 mg/dL      Creatinine 0 83 mg/dL      Glucose 100 mg/dL      Calcium 9 3 mg/dL      AST 15 U/L      ALT 16 U/L      Alkaline Phosphatase 63 U/L      Total Protein 7 7 g/dL      Albumin 4 4 g/dL      Total Bilirubin 0 33 mg/dL      eGFR 97 ml/min/1 73sq m     Narrative:      [facility] guidelines for Chronic Kidney Disease (CKD):   •  Stage 1 with normal or high GFR (GFR > 90 mL/min/1 73 square meters)  •  Stage 2 Mild CKD (GFR = 60-89 mL/min/1 73 square meters)  •  Stage 3A Moderate CKD (GFR = 45-59 mL/min/1 73 square meters)  •  Stage 3B Moderate CKD (GFR = 30-44 mL/min/1 73 square meters)  •  Stage 4 Severe CKD (GFR = 15-29 mL/min/1 73 square meters)  •  Stage 5 End Stage CKD (GFR <15 mL/min/1 73 square meters)  Note: GFR calculation is accurate only with a steady state creatinine    Lipase [[id number]]  (Normal) Collected: 04/22/23 0945    Lab Status: Final result Specimen: Blood from Arm, Left Updated: 04/22/23 1020     Lipase 12 u/L     Urine Microscopic [[id number]]  (Abnormal) Collected: 04/22/23 0931    Lab Status: Final result Specimen: Urine, Clean Catch Updated: 04/22/23 1009     RBC, UA 2-4 /hpf      WBC, UA 1-2 /hpf      Epithelial Cells Occasional /hpf      Bacteria, UA Moderate /hpf      MUCUS THREADS Moderate POCT pregnancy, urine [[id number]]  (Normal) Resulted: 04/22/23 0935    Lab Status: Final result Updated: 04/22/23 0936     EXT Preg Test, Ur Negative     Control Valid    Urine Macroscopic, POC [[id number]]  (Abnormal) Collected: 04/22/23 0931    Lab Status: Final result Specimen: Urine Updated: 04/22/23 0933     Color, UA Yellow     Clarity, UA Clear     pH, UA 7 0     Leukocytes, UA Negative     Nitrite, UA Negative     Protein, UA 30 (1+) mg/dl      Glucose, UA Negative mg/dl      Ketones, UA Negative mg/dl      Urobilinogen, UA 1 0 E U /dl      Bilirubin, UA Negative     Occult Blood, UA Trace     Specific [place], UA 1 025    Narrative:      CLINITEK RESULT                 No orders to display              Procedures  Procedures         ED Course  ED Course as of 04/22/23 1129   Sat Apr 22, 2023   1100 Reviewed results with patient at bedside and updated on the plan  ED workup is reassuring, considering appendicitis, pancreatitis, cholecystitis, biliary colic, her pain is down to 1/10, and no localizing to RLQ  I do not think any additional imaging is indicated at this time   Treat symptomatically and I went over return precautions  SBIRT 22yo+    Flowsheet Row Most Recent Value   Initial Alcohol Screen: US AUDIT-C     1  How often do you have a drink containing alcohol? 0 Filed at: 04/22/2023 0938   2  How many drinks containing alcohol do you have on a typical day you are drinking? 0 Filed at: 04/22/2023 0938   3a  Male UNDER 65: How often do you have five or more drinks on one occasion? 0 Filed at: 04/22/2023 0938   3b  FEMALE Any Age, or MALE 65+: How often do you have 4 or more drinks on one occassion? 0 Filed at: 04/22/2023 7830   Audit-C Score 0 Filed at: 04/22/2023 6246   DORY: How many times in the past year have you    Used an illegal drug or used a prescription medication for non-medical reasons?  Never Filed at: 04/22/2023 2434 MDM    Disposition  Final diagnoses:   Epigastric pain - probable gastritis   Nausea & vomiting     Time reflects when diagnosis was documented in both MDM as applicable and the Disposition within this note     Time User Action Codes Description Comment    4/22/2023 11:02 AM Patillas Penjose manuelt L Add [R10 13] Epigastric pain     4/22/2023 11:02 AM Gurmeet Penjose manuelt L Add [R11 2] Nausea & vomiting     4/22/2023 11:02 AM Connie Goldberg Modify [R10 13] Epigastric pain probable gastritis      ED Disposition     ED Disposition   Discharge    Condition   Good    Date/Time   Sat Apr 22, 2023 11:03 AM    Comment   Diane Dahl discharge to home/self care  Follow-up Information     Follow up With Specialties Details Why Contact Info    Erasmo Short, DO Family Medicine Call  If symptoms worsen [address] [facility] [address] [phone]            Patient's Medications   Discharge Prescriptions    FAMOTIDINE (PEPCID) 20 MG TABLET    Take 1 tablet (20 mg total) by mouth 2 (two) times a day       Start Date: 4/22/2023 End Date: --       Order Dose: 20 mg       Quantity: 28 tablet    Refills: 0    ONDANSETRON (ZOFRAN-ODT) 4 MG DISINTEGRATING TABLET    Take 1 tablet (4 mg total) by mouth every 6 (six) hours as needed for nausea or vomiting       Start Date: 4/22/2023 End Date: --       Order Dose: 4 mg       Quantity: 8 tablet    Refills: 0       No discharge procedures on file      PDMP Review     None          ED Provider  Electronically Signed by           Larisa Sequeira MD  04/22/23 8411

## 2024-08-10 ENCOUNTER — APPOINTMENT (OUTPATIENT)
Dept: RADIOLOGY | Age: 29
End: 2024-08-10
Payer: OTHER MISCELLANEOUS

## 2024-08-10 ENCOUNTER — OCCMED (OUTPATIENT)
Dept: URGENT CARE | Age: 29
End: 2024-08-10
Payer: OTHER MISCELLANEOUS

## 2024-08-10 DIAGNOSIS — M25.532 LEFT WRIST PAIN: ICD-10-CM

## 2024-08-10 DIAGNOSIS — M25.532 LEFT WRIST PAIN: Primary | ICD-10-CM

## 2024-08-10 PROCEDURE — 99284 EMERGENCY DEPT VISIT MOD MDM: CPT

## 2024-08-10 PROCEDURE — G0383 LEV 4 HOSP TYPE B ED VISIT: HCPCS

## 2024-08-10 PROCEDURE — 73110 X-RAY EXAM OF WRIST: CPT

## 2024-08-14 ENCOUNTER — APPOINTMENT (OUTPATIENT)
Dept: URGENT CARE | Age: 29
End: 2024-08-14
Payer: OTHER MISCELLANEOUS

## 2024-08-14 PROCEDURE — 99213 OFFICE O/P EST LOW 20 MIN: CPT

## 2024-08-21 ENCOUNTER — APPOINTMENT (OUTPATIENT)
Dept: URGENT CARE | Age: 29
End: 2024-08-21
Payer: OTHER MISCELLANEOUS

## 2024-08-21 PROCEDURE — 99213 OFFICE O/P EST LOW 20 MIN: CPT

## 2025-01-12 ENCOUNTER — HOSPITAL ENCOUNTER (EMERGENCY)
Facility: HOSPITAL | Age: 30
Discharge: HOME | End: 2025-01-13

## 2025-01-12 DIAGNOSIS — R11.2 NAUSEA AND VOMITING: Primary | ICD-10-CM

## 2025-01-12 LAB
ALBUMIN SERPL BCG-MCNC: 4.8 G/DL (ref 3.5–5)
ALP SERPL-CCNC: 61 U/L (ref 34–104)
ALT SERPL W P-5'-P-CCNC: 15 U/L (ref 7–52)
ANION GAP SERPL CALCULATED.3IONS-SCNC: 8 MMOL/L (ref 4–13)
AST SERPL W P-5'-P-CCNC: 18 U/L (ref 13–39)
BASOPHILS # BLD AUTO: 0.04 THOUSANDS/ΜL (ref 0–0.1)
BASOPHILS NFR BLD AUTO: 1 % (ref 0–1)
BILIRUB SERPL-MCNC: 0.64 MG/DL (ref 0.2–1)
BUN SERPL-MCNC: 13 MG/DL (ref 5–25)
CALCIUM SERPL-MCNC: 10.1 MG/DL (ref 8.4–10.2)
CHLORIDE SERPL-SCNC: 102 MMOL/L (ref 96–108)
CO2 SERPL-SCNC: 30 MMOL/L (ref 21–32)
CREAT SERPL-MCNC: 0.96 MG/DL (ref 0.6–1.3)
EOSINOPHIL # BLD AUTO: 0.03 THOUSAND/ΜL (ref 0–0.61)
EOSINOPHIL NFR BLD AUTO: 0 % (ref 0–6)
ERYTHROCYTE [DISTWIDTH] IN BLOOD BY AUTOMATED COUNT: 13.5 % (ref 11.6–15.1)
EXT PREGNANCY TEST URINE: NEGATIVE
EXT. CONTROL: NORMAL
GFR SERPL CREATININE-BSD FRML MDRD: 80 ML/MIN/1.73SQ M
GLUCOSE SERPL-MCNC: 80 MG/DL (ref 65–140)
HCT VFR BLD AUTO: 43.8 % (ref 34.8–46.1)
HGB BLD-MCNC: 14.5 G/DL (ref 11.5–15.4)
IMM GRANULOCYTES # BLD AUTO: 0.01 THOUSAND/UL (ref 0–0.2)
IMM GRANULOCYTES NFR BLD AUTO: 0 % (ref 0–2)
LIPASE SERPL-CCNC: 20 U/L (ref 11–82)
LYMPHOCYTES # BLD AUTO: 2.74 THOUSANDS/ΜL (ref 0.6–4.47)
LYMPHOCYTES NFR BLD AUTO: 36 % (ref 14–44)
MCH RBC QN AUTO: 30.6 PG (ref 26.8–34.3)
MCHC RBC AUTO-ENTMCNC: 33.1 G/DL (ref 31.4–37.4)
MCV RBC AUTO: 92 FL (ref 82–98)
MONOCYTES # BLD AUTO: 0.51 THOUSAND/ΜL (ref 0.17–1.22)
MONOCYTES NFR BLD AUTO: 7 % (ref 4–12)
NEUTROPHILS # BLD AUTO: 4.39 THOUSANDS/ΜL (ref 1.85–7.62)
NEUTS SEG NFR BLD AUTO: 56 % (ref 43–75)
NRBC BLD AUTO-RTO: 0 /100 WBCS
PLATELET # BLD AUTO: 267 THOUSANDS/UL (ref 149–390)
PMV BLD AUTO: 10.9 FL (ref 8.9–12.7)
POTASSIUM SERPL-SCNC: 3.5 MMOL/L (ref 3.5–5.3)
PROT SERPL-MCNC: 8.2 G/DL (ref 6.4–8.4)
RBC # BLD AUTO: 4.74 MILLION/UL (ref 3.81–5.12)
SODIUM SERPL-SCNC: 140 MMOL/L (ref 135–147)
WBC # BLD AUTO: 7.72 THOUSAND/UL (ref 4.31–10.16)

## 2025-01-12 PROCEDURE — 96361 HYDRATE IV INFUSION ADD-ON: CPT

## 2025-01-12 PROCEDURE — 36415 COLL VENOUS BLD VENIPUNCTURE: CPT

## 2025-01-12 PROCEDURE — 99284 EMERGENCY DEPT VISIT MOD MDM: CPT

## 2025-01-12 PROCEDURE — 83690 ASSAY OF LIPASE: CPT

## 2025-01-12 PROCEDURE — 96375 TX/PRO/DX INJ NEW DRUG ADDON: CPT

## 2025-01-12 PROCEDURE — 96374 THER/PROPH/DIAG INJ IV PUSH: CPT

## 2025-01-12 PROCEDURE — 80053 COMPREHEN METABOLIC PANEL: CPT

## 2025-01-12 PROCEDURE — 99283 EMERGENCY DEPT VISIT LOW MDM: CPT

## 2025-01-12 PROCEDURE — 81025 URINE PREGNANCY TEST: CPT

## 2025-01-12 PROCEDURE — 85025 COMPLETE CBC W/AUTO DIFF WBC: CPT

## 2025-01-12 RX ORDER — FAMOTIDINE 10 MG/ML
20 INJECTION, SOLUTION INTRAVENOUS ONCE
Status: COMPLETED | OUTPATIENT
Start: 2025-01-12 | End: 2025-01-12

## 2025-01-12 RX ORDER — KETOROLAC TROMETHAMINE 30 MG/ML
15 INJECTION, SOLUTION INTRAMUSCULAR; INTRAVENOUS ONCE
Status: COMPLETED | OUTPATIENT
Start: 2025-01-12 | End: 2025-01-12

## 2025-01-12 RX ORDER — ONDANSETRON 2 MG/ML
4 INJECTION INTRAMUSCULAR; INTRAVENOUS ONCE
Status: COMPLETED | OUTPATIENT
Start: 2025-01-12 | End: 2025-01-12

## 2025-01-12 RX ADMIN — KETOROLAC TROMETHAMINE 15 MG: 30 INJECTION, SOLUTION INTRAMUSCULAR; INTRAVENOUS at 23:28

## 2025-01-12 RX ADMIN — FAMOTIDINE 20 MG: 10 INJECTION, SOLUTION INTRAVENOUS at 23:29

## 2025-01-12 RX ADMIN — ONDANSETRON 4 MG: 2 INJECTION INTRAMUSCULAR; INTRAVENOUS at 23:27

## 2025-01-12 RX ADMIN — SODIUM CHLORIDE 1000 ML: 0.9 INJECTION, SOLUTION INTRAVENOUS at 23:27

## 2025-01-12 NOTE — Clinical Note
Lary Dahl was seen and treated in our emergency department on 1/12/2025.    No restrictions            Diagnosis:     Lary  may return to work on return date.    She may return on this date: 01/14/2025         If you have any questions or concerns, please don't hesitate to call.      Evonne Shields, FLORI    ______________________________           _______________          _______________  Hospital Representative                              Date                                Time

## 2025-01-13 VITALS
HEIGHT: 64 IN | DIASTOLIC BLOOD PRESSURE: 87 MMHG | RESPIRATION RATE: 18 BRPM | BODY MASS INDEX: 27.93 KG/M2 | SYSTOLIC BLOOD PRESSURE: 143 MMHG | HEART RATE: 66 BPM | TEMPERATURE: 98.2 F | WEIGHT: 163.58 LBS | OXYGEN SATURATION: 100 %

## 2025-01-13 RX ORDER — FAMOTIDINE 20 MG/1
20 TABLET, FILM COATED ORAL 2 TIMES DAILY
Qty: 30 TABLET | Refills: 0 | Status: SHIPPED | OUTPATIENT
Start: 2025-01-13

## 2025-01-13 RX ORDER — ONDANSETRON 4 MG/1
4 TABLET, ORALLY DISINTEGRATING ORAL EVERY 6 HOURS PRN
Qty: 15 TABLET | Refills: 0 | Status: SHIPPED | OUTPATIENT
Start: 2025-01-13

## 2025-01-13 RX ORDER — ALUMINUM HYDROXIDE, MAGNESIUM HYDROXIDE, SIMETHICONE 400; 400; 40 MG/10ML; MG/10ML; MG/10ML
30 SUSPENSION ORAL
Qty: 3600 ML | Refills: 0 | Status: SHIPPED | OUTPATIENT
Start: 2025-01-13

## 2025-01-13 NOTE — ED PROVIDER NOTES
Time reflects when diagnosis was documented in both MDM as applicable and the Disposition within this note       Time User Action Codes Description Comment    1/13/2025 12:59 AM Rossy Duarte [R11.2] Nausea and vomiting           ED Disposition       ED Disposition   Discharge    Condition   Stable    Date/Time   Mon Jan 13, 2025 12:59 AM    Comment   Lary Dahl discharge to home/self care.                   Assessment & Plan       Medical Decision Making  DDx including but not limited to: food poisoning, viral illness, metabolic abnormality, dehydration GI etiology, hyperemesis gravidarum, ; doubt acute surgical intraabdominal process, Boorhave's syndrome, mediastinitis.     Will obtain CBC to evaluate for leukocytosis, anemia.  Will obtain CMP to evaluate kidney function, for electrolyte disturbance.  Will obtain lipase to evaluate for pancreatitis.  Will obtain urine pregnancy.     Labs unremarkable. U preg negative.  Following symptomatic treatment, patient reports significant improvement in symptoms.  Patient p.o. challenged without additional nausea, vomiting.    At the time of discharge, the patient is in no acute distress. I discussed with the patient the diagnosis, treatment plan, follow-up, return precautions, and discharge instructions; they were given the opportunity to ask questions and verbalized understanding.    Problems Addressed:  Nausea and vomiting: acute illness or injury    Amount and/or Complexity of Data Reviewed  External Data Reviewed: notes.  Labs: ordered. Decision-making details documented in ED Course.    Risk  OTC drugs.  Prescription drug management.        ED Course as of 01/13/25 0323   Mon Jan 13, 2025   0016 PO challenging        Medications   sodium chloride 0.9 % bolus 1,000 mL (0 mL Intravenous Stopped 1/13/25 0029)   ondansetron (ZOFRAN) injection 4 mg (4 mg Intravenous Given 1/12/25 2297)   ketorolac (TORADOL) injection 15 mg (15 mg Intravenous Given  1/12/25 7108)   Famotidine (PF) (PEPCID) injection 20 mg (20 mg Intravenous Given 1/12/25 2329)       ED Risk Strat Scores                          SBIRT 20yo+      Flowsheet Row Most Recent Value   Initial Alcohol Screen: US AUDIT-C     1. How often do you have a drink containing alcohol? 0 Filed at: 01/12/2025 2204   2. How many drinks containing alcohol do you have on a typical day you are drinking?  0 Filed at: 01/12/2025 2204   3b. FEMALE Any Age, or MALE 65+: How often do you have 4 or more drinks on one occassion? 0 Filed at: 01/12/2025 2204   Audit-C Score 0 Filed at: 01/12/2025 2204   DORY: How many times in the past year have you...    Used an illegal drug or used a prescription medication for non-medical reasons? Never Filed at: 01/12/2025 2204                            History of Present Illness       Chief Complaint   Patient presents with    Abdominal Pain     Pt reports generalized abd pain and vomiting that started today. Believes she ate bad pork last night.        Past Medical History:   Diagnosis Date    Prediabetes       Past Surgical History:   Procedure Laterality Date    KNEE CARTILAGE SURGERY Left 03/21/2023      History reviewed. No pertinent family history.   Social History     Tobacco Use    Smoking status: Never    Smokeless tobacco: Never   Substance Use Topics    Alcohol use: Not Currently    Drug use: Never      E-Cigarette/Vaping      E-Cigarette/Vaping Substances      I have reviewed and agree with the history as documented.     The patient is a 29-year-old female with no significant past medical history presents to the ED for evaluation of 1 day history of epigastric, crampy abdominal pain, nausea, or vomiting that began at 6 AM.  She reports she has had 5 episodes of nonbloody vomiting.  She reports she slept last night that she suspects caused her symptoms, though other people ate the same food without the same symptoms.  She otherwise denies fever, chills, cough, congestion,  chest pain, dyspnea, constipation, diarrhea, melena, hematochezia, dysuria, hematuria, vaginal bleeding, vaginal discharge.        Review of Systems   Constitutional:  Negative for chills and fever.   HENT:  Negative for congestion and rhinorrhea.    Respiratory:  Negative for cough and shortness of breath.    Cardiovascular:  Negative for chest pain and leg swelling.   Gastrointestinal:  Positive for abdominal pain, nausea and vomiting. Negative for constipation and diarrhea.   Genitourinary:  Negative for dysuria and flank pain.   Musculoskeletal:  Negative for arthralgias and myalgias.   Skin:  Negative for rash and wound.   Neurological:  Negative for dizziness, weakness, numbness and headaches.   Psychiatric/Behavioral:  Negative for behavioral problems.            Objective       ED Triage Vitals   Temperature Pulse Blood Pressure Respirations SpO2 Patient Position - Orthostatic VS   01/12/25 2203 01/12/25 2203 01/12/25 2205 01/12/25 2203 01/12/25 2203 01/12/25 2205   98.2 °F (36.8 °C) 74 147/65 16 98 % Sitting      Temp Source Heart Rate Source BP Location FiO2 (%) Pain Score    01/12/25 2203 01/12/25 2203 01/12/25 2205 -- 01/12/25 2327    Oral Monitor Right arm  8      Vitals      Date and Time Temp Pulse SpO2 Resp BP Pain Score FACES Pain Rating User   01/13/25 0008 -- 66 100 % 18 143/87 -- -- MM   01/12/25 2327 -- -- -- -- -- 8 -- MM   01/12/25 2205 -- -- -- -- 147/65 -- -- DS   01/12/25 2203 98.2 °F (36.8 °C) 74 98 % 16 -- -- -- DS            Physical Exam  Vitals and nursing note reviewed.   Constitutional:       General: She is not in acute distress.     Appearance: She is well-developed. She is not toxic-appearing.   HENT:      Head: Normocephalic and atraumatic.   Eyes:      Conjunctiva/sclera: Conjunctivae normal.   Cardiovascular:      Rate and Rhythm: Normal rate and regular rhythm.      Heart sounds: No murmur heard.  Pulmonary:      Effort: Pulmonary effort is normal. No respiratory distress.       Breath sounds: Normal breath sounds.   Abdominal:      Palpations: Abdomen is soft.      Tenderness: There is abdominal tenderness in the epigastric area. There is no guarding or rebound.   Musculoskeletal:         General: No swelling.      Cervical back: Neck supple.   Skin:     General: Skin is warm and dry.      Capillary Refill: Capillary refill takes less than 2 seconds.   Neurological:      Mental Status: She is alert.   Psychiatric:         Mood and Affect: Mood normal.         Results Reviewed       Procedure Component Value Units Date/Time    Comprehensive metabolic panel [[id number]] Collected: 01/12/25 2324    Lab Status: Final result Specimen: Blood from Arm, Right Updated: 01/12/25 2356     Sodium 140 mmol/L      Potassium 3.5 mmol/L      Chloride 102 mmol/L      CO2 30 mmol/L      ANION GAP 8 mmol/L      BUN 13 mg/dL      Creatinine 0.96 mg/dL      Glucose 80 mg/dL      Calcium 10.1 mg/dL      AST 18 U/L      ALT 15 U/L      Alkaline Phosphatase 61 U/L      Total Protein 8.2 g/dL      Albumin 4.8 g/dL      Total Bilirubin 0.64 mg/dL      eGFR 80 ml/min/1.73sq m     Narrative:      National Kid[employer] guidelines for Chronic Kidney Disease (CKD):     Stage 1 with normal or high GFR (GFR > 90 mL/min/1.73 square meters)    Stage 2 Mild CKD (GFR = 60-89 mL/min/1.73 square meters)    Stage 3A Moderate CKD (GFR = 45-59 mL/min/1.73 square meters)    Stage 3B Moderate CKD (GFR = 30-44 mL/min/1.73 square meters)    Stage 4 Severe CKD (GFR = 15-29 mL/min/1.73 square meters)    Stage 5 End Stage CKD (GFR <15 mL/min/1.73 square meters)  Note: GFR calculation is accurate only with a steady state creatinine    Lipase [[id number]]  (Normal) Collected: 01/12/25 2324    Lab Status: Final result Specimen: Blood from Arm, Right Updated: 01/12/25 2356     Lipase 20 u/L     CBC and differential [[id number]] Collected: 01/12/25 2324    Lab Status: Final result Specimen: Blood from Arm, Right Updated: 01/12/25  2337     WBC 7.72 Thousand/uL      RBC 4.74 Million/uL      Hemoglobin 14.5 g/dL      Hematocrit 43.8 %      MCV 92 fL      MCH 30.6 pg      MCHC 33.1 g/dL      RDW 13.5 %      MPV 10.9 fL      Platelets 267 Thousands/uL      nRBC 0 /100 WBCs      Segmented % 56 %      Immature Grans % 0 %      Lymphocytes % 36 %      Monocytes % 7 %      Eosinophils Relative 0 %      Basophils Relative 1 %      Absolute Neutrophils 4.39 Thousands/µL      Absolute Immature Grans 0.01 Thousand/uL      Absolute Lymphocytes 2.74 Thousands/µL      Absolute Monocytes 0.51 Thousand/µL      Eosinophils Absolute 0.03 Thousand/µL      Basophils Absolute 0.04 Thousands/µL     POCT pregnancy, urine [[id number]]  (Normal) Collected: 01/12/25 2310    Lab Status: Final result Updated: 01/12/25 2310     EXT Preg Test, Ur Negative     Control Valid            No orders to display       Procedures    ED Medication and Procedure Management   Prior to Admission Medications   Prescriptions Last Dose Informant Patient Reported? Taking?   cyclobenzaprine (FLEXERIL) 10 mg tablet   No No   Sig: Take 1 tablet (10 mg total) by mouth 2 (two) times a day as needed for muscle spasms   Patient not taking: Reported on 10/16/2022   dicyclomine (BENTYL) 20 mg tablet   No No   Sig: Take 1 tablet (20 mg total) by mouth 2 (two) times a day for 5 days   diphenhydrAMINE (BENADRYL) 25 mg tablet   No No   Sig: Take 1 tablet (25 mg total) by mouth every 6 (six) hours   Patient not taking: No sig reported   famotidine (PEPCID) 20 mg tablet   No No   Sig: Take 1 tablet (20 mg total) by mouth 2 (two) times a day   naproxen (NAPROSYN) 500 mg tablet   No No   Sig: Take 1 tablet (500 mg total) by mouth 2 (two) times a day as needed for mild pain   Patient not taking: Reported on 10/16/2022   ondansetron (ZOFRAN-ODT) 4 mg disintegrating tablet   No No   Sig: Take 1 tablet (4 mg total) by mouth every 6 (six) hours as needed for nausea or vomiting      Facility-Administered  Medications: None     Discharge Medication List as of 1/13/2025  1:03 AM        START taking these medications    Details   aluminum-magnesium hydroxide-simethicone (MAALOX) 200-200-20 MG/5ML SUSP Take 30 mL by mouth 4 (four) times a day (before meals and at bedtime), Starting Mon 1/13/2025, Normal      !! famotidine (PEPCID) 20 mg tablet Take 1 tablet (20 mg total) by mouth 2 (two) times a day, Starting Mon 1/13/2025, Normal      !! ondansetron (ZOFRAN-ODT) 4 mg disintegrating tablet Take 1 tablet (4 mg total) by mouth every 6 (six) hours as needed for nausea or vomiting, Starting Mon 1/13/2025, Normal       !! - Potential duplicate medications found. Please discuss with provider.        CONTINUE these medications which have NOT CHANGED    Details   cyclobenzaprine (FLEXERIL) 10 mg tablet Take 1 tablet (10 mg total) by mouth 2 (two) times a day as needed for muscle spasms, Starting Sun 7/10/2022, Normal      dicyclomine (BENTYL) 20 mg tablet Take 1 tablet (20 mg total) by mouth 2 (two) times a day for 5 days, Starting Tue 2/16/2021, Until Sun 2/21/2021, Print      diphenhydrAMINE (BENADRYL) 25 mg tablet Take 1 tablet (25 mg total) by mouth every 6 (six) hours, Starting Wed 5/29/2019, Print      !! famotidine (PEPCID) 20 mg tablet Take 1 tablet (20 mg total) by mouth 2 (two) times a day, Starting Sat 4/22/2023, Normal      naproxen (NAPROSYN) 500 mg tablet Take 1 tablet (500 mg total) by mouth 2 (two) times a day as needed for mild pain, Starting Sun 7/10/2022, Normal      !! ondansetron (ZOFRAN-ODT) 4 mg disintegrating tablet Take 1 tablet (4 mg total) by mouth every 6 (six) hours as needed for nausea or vomiting, Starting Sat 4/22/2023, Normal       !! - Potential duplicate medications found. Please discuss with provider.        No discharge procedures on file.  ED SEPSIS DOCUMENTATION   Time reflects when diagnosis was documented in both MDM as applicable and the Disposition within this note       Time User  Action Codes Description Comment    1/13/2025 12:59 AM Rossy Duarte Add [R11.2] Nausea and vomiting                  Rossy Duarte PA-C  01/13/25 0323

## 2025-01-13 NOTE — DISCHARGE INSTRUCTIONS
[employer] Zofran según lo recetado según sea necesario para las náuseas/vómitos.  No tiene que niles esto si tiene náuseas/vómitos    [employer] Maalox según lo recetado, según sea necesario para el dolor abdominal    [employer] Pepcid según lo recetado según sea necesario para el dolor abdominal    Regresa por empeoramiento del dolor, vómitos incontrolables, dolor en el pecho, dificultad para respirar o cualquier otro síntoma nuevo o preocupante       =============  Take Zofran as prescribed as needed for nausea/vomiting.  You do not have to take this if you are nausea/vomiting    Take Maalox as prescribed as needed for abdominal pain    Take Pepcid as prescribed as needed for abdominal pain    Return for worsening pain, uncontrolled vomiting, chest pain, trouble breathing, or any other new/concerning symptoms

## 2025-05-04 ENCOUNTER — HOSPITAL ENCOUNTER (EMERGENCY)
Facility: HOSPITAL | Age: 30
Discharge: HOME | End: 2025-05-05
Attending: EMERGENCY MEDICINE
Payer: COMMERCIAL

## 2025-05-04 VITALS
RESPIRATION RATE: 17 BRPM | SYSTOLIC BLOOD PRESSURE: 99 MMHG | HEART RATE: 78 BPM | TEMPERATURE: 97.7 F | BODY MASS INDEX: 30.27 KG/M2 | OXYGEN SATURATION: 97 % | WEIGHT: 176.37 LBS | DIASTOLIC BLOOD PRESSURE: 53 MMHG

## 2025-05-04 DIAGNOSIS — R11.2 NAUSEA AND VOMITING: ICD-10-CM

## 2025-05-04 DIAGNOSIS — R10.9 ABDOMINAL PAIN: Primary | ICD-10-CM

## 2025-05-04 LAB
ALBUMIN SERPL BCG-MCNC: 4.8 G/DL (ref 3.5–5)
ALP SERPL-CCNC: 67 U/L (ref 34–104)
ALT SERPL W P-5'-P-CCNC: 17 U/L (ref 7–52)
ANION GAP SERPL CALCULATED.3IONS-SCNC: 8 MMOL/L (ref 4–13)
AST SERPL W P-5'-P-CCNC: 17 U/L (ref 13–39)
BACTERIA UR QL AUTO: ABNORMAL /HPF
BASOPHILS # BLD AUTO: 0.03 THOUSANDS/ÂΜL (ref 0–0.1)
BASOPHILS NFR BLD AUTO: 0 % (ref 0–1)
BILIRUB SERPL-MCNC: 0.61 MG/DL (ref 0.2–1)
BILIRUB UR QL STRIP: NEGATIVE
BUN SERPL-MCNC: 13 MG/DL (ref 5–25)
CALCIUM SERPL-MCNC: 9.5 MG/DL (ref 8.4–10.2)
CHLORIDE SERPL-SCNC: 104 MMOL/L (ref 96–108)
CLARITY UR: CLEAR
CO2 SERPL-SCNC: 28 MMOL/L (ref 21–32)
COLOR UR: YELLOW
CREAT SERPL-MCNC: 0.85 MG/DL (ref 0.6–1.3)
EOSINOPHIL # BLD AUTO: 0.01 THOUSAND/ÂΜL (ref 0–0.61)
EOSINOPHIL NFR BLD AUTO: 0 % (ref 0–6)
ERYTHROCYTE [DISTWIDTH] IN BLOOD BY AUTOMATED COUNT: 13.2 % (ref 11.6–15.1)
EXT PREGNANCY TEST URINE: NEGATIVE
EXT. CONTROL: NORMAL
GFR SERPL CREATININE-BSD FRML MDRD: 93 ML/MIN/1.73SQ M
GLUCOSE SERPL-MCNC: 83 MG/DL (ref 65–140)
GLUCOSE UR STRIP-MCNC: NEGATIVE MG/DL
HCT VFR BLD AUTO: 43 % (ref 34.8–46.1)
HGB BLD-MCNC: 14.3 G/DL (ref 11.5–15.4)
HGB UR QL STRIP.AUTO: NEGATIVE
IMM GRANULOCYTES # BLD AUTO: 0.01 THOUSAND/UL (ref 0–0.2)
IMM GRANULOCYTES NFR BLD AUTO: 0 % (ref 0–2)
KETONES UR STRIP-MCNC: ABNORMAL MG/DL
LEUKOCYTE ESTERASE UR QL STRIP: NEGATIVE
LIPASE SERPL-CCNC: 14 U/L (ref 11–82)
LYMPHOCYTES # BLD AUTO: 2.13 THOUSANDS/ÂΜL (ref 0.6–4.47)
LYMPHOCYTES NFR BLD AUTO: 30 % (ref 14–44)
MCH RBC QN AUTO: 30.9 PG (ref 26.8–34.3)
MCHC RBC AUTO-ENTMCNC: 33.3 G/DL (ref 31.4–37.4)
MCV RBC AUTO: 93 FL (ref 82–98)
MONOCYTES # BLD AUTO: 0.45 THOUSAND/ÂΜL (ref 0.17–1.22)
MONOCYTES NFR BLD AUTO: 6 % (ref 4–12)
MUCOUS THREADS UR QL AUTO: ABNORMAL
NEUTROPHILS # BLD AUTO: 4.41 THOUSANDS/ÂΜL (ref 1.85–7.62)
NEUTS SEG NFR BLD AUTO: 64 % (ref 43–75)
NITRITE UR QL STRIP: NEGATIVE
NON-SQ EPI CELLS URNS QL MICRO: ABNORMAL /HPF
NRBC BLD AUTO-RTO: 0 /100 WBCS
PH UR STRIP.AUTO: 7.5 [PH] (ref 4.5–8)
PLATELET # BLD AUTO: 269 THOUSANDS/UL (ref 149–390)
PMV BLD AUTO: 10.5 FL (ref 8.9–12.7)
POTASSIUM SERPL-SCNC: 3.6 MMOL/L (ref 3.5–5.3)
PROT SERPL-MCNC: 8.3 G/DL (ref 6.4–8.4)
PROT UR STRIP-MCNC: ABNORMAL MG/DL
RBC # BLD AUTO: 4.63 MILLION/UL (ref 3.81–5.12)
RBC #/AREA URNS AUTO: ABNORMAL /HPF
SODIUM SERPL-SCNC: 140 MMOL/L (ref 135–147)
SP GR UR STRIP.AUTO: 1.02 (ref 1–1.03)
UROBILINOGEN UR QL STRIP.AUTO: 0.2 E.U./DL
WBC # BLD AUTO: 7.04 THOUSAND/UL (ref 4.31–10.16)
WBC #/AREA URNS AUTO: ABNORMAL /HPF

## 2025-05-04 PROCEDURE — 83690 ASSAY OF LIPASE: CPT | Performed by: EMERGENCY MEDICINE

## 2025-05-04 PROCEDURE — 96374 THER/PROPH/DIAG INJ IV PUSH: CPT

## 2025-05-04 PROCEDURE — 85025 COMPLETE CBC W/AUTO DIFF WBC: CPT | Performed by: EMERGENCY MEDICINE

## 2025-05-04 PROCEDURE — 81001 URINALYSIS AUTO W/SCOPE: CPT

## 2025-05-04 PROCEDURE — 81025 URINE PREGNANCY TEST: CPT | Performed by: EMERGENCY MEDICINE

## 2025-05-04 PROCEDURE — 80053 COMPREHEN METABOLIC PANEL: CPT | Performed by: EMERGENCY MEDICINE

## 2025-05-04 PROCEDURE — 99284 EMERGENCY DEPT VISIT MOD MDM: CPT

## 2025-05-04 PROCEDURE — 96375 TX/PRO/DX INJ NEW DRUG ADDON: CPT

## 2025-05-04 PROCEDURE — 36415 COLL VENOUS BLD VENIPUNCTURE: CPT | Performed by: EMERGENCY MEDICINE

## 2025-05-04 PROCEDURE — 96361 HYDRATE IV INFUSION ADD-ON: CPT

## 2025-05-04 PROCEDURE — 99285 EMERGENCY DEPT VISIT HI MDM: CPT | Performed by: EMERGENCY MEDICINE

## 2025-05-04 RX ORDER — ONDANSETRON 2 MG/ML
4 INJECTION INTRAMUSCULAR; INTRAVENOUS ONCE
Status: COMPLETED | OUTPATIENT
Start: 2025-05-04 | End: 2025-05-04

## 2025-05-04 RX ORDER — KETOROLAC TROMETHAMINE 30 MG/ML
15 INJECTION, SOLUTION INTRAMUSCULAR; INTRAVENOUS ONCE
Status: COMPLETED | OUTPATIENT
Start: 2025-05-04 | End: 2025-05-04

## 2025-05-04 RX ADMIN — KETOROLAC TROMETHAMINE 15 MG: 30 INJECTION, SOLUTION INTRAMUSCULAR; INTRAVENOUS at 22:47

## 2025-05-04 RX ADMIN — ONDANSETRON 4 MG: 2 INJECTION INTRAMUSCULAR; INTRAVENOUS at 22:48

## 2025-05-04 RX ADMIN — SODIUM CHLORIDE 1000 ML: 0.9 INJECTION, SOLUTION INTRAVENOUS at 22:44

## 2025-05-05 ENCOUNTER — APPOINTMENT (EMERGENCY)
Dept: CT IMAGING | Facility: HOSPITAL | Age: 30
End: 2025-05-05
Payer: COMMERCIAL

## 2025-05-05 PROCEDURE — 96361 HYDRATE IV INFUSION ADD-ON: CPT

## 2025-05-05 PROCEDURE — 74177 CT ABD & PELVIS W/CONTRAST: CPT

## 2025-05-05 RX ORDER — ONDANSETRON 4 MG/1
4 TABLET, FILM COATED ORAL EVERY 6 HOURS
Qty: 12 TABLET | Refills: 0 | Status: SHIPPED | OUTPATIENT
Start: 2025-05-05

## 2025-05-05 RX ADMIN — IOHEXOL 100 ML: 350 INJECTION, SOLUTION INTRAVENOUS at 00:46

## 2025-05-05 NOTE — ED PROVIDER NOTES
Time reflects when diagnosis was documented in both MDM as applicable and the Disposition within this note       Time User Action Codes Description Comment    5/5/2025  2:12 AM Bryce Ricketts Add [R10.9] Abdominal pain     5/5/2025  2:12 AM Bryce Ricketts Add [R11.2] Nausea and vomiting           ED Disposition       ED Disposition   Discharge    Condition   Stable    Date/Time   Mon May 5, 2025  2:12 AM    Comment   Lary Dahl discharge to home/self care.                   Assessment & Plan       Medical Decision Making  A 29-year-old female presents with abdominal pain and vomiting, she does have right lower quadrant tenderness on exam.  Will check labs to evaluate for electrolyte abnormality, ALEXA, anemia, significant leukocytosis, pancreatitis, hepatitis and biliary etiology.  Will check urine for infection and pregnancy.  Will check CTAP to rule out appendicitis.  Will treat symptomatically.    Amount and/or Complexity of Data Reviewed  Labs: ordered. Decision-making details documented in ED Course.  Radiology: ordered.    Risk  Prescription drug management.        ED Course as of 05/07/25 0816   Sun May 04, 2025   2301 WBC: 7.04  WNL   2324 Remainder of labs WNL   2350 Pt feeling better, pending CTAP       Medications   ondansetron (ZOFRAN) injection 4 mg (4 mg Intravenous Given 5/4/25 2248)   sodium chloride 0.9 % bolus 1,000 mL (0 mL Intravenous Stopped 5/5/25 0226)   ketorolac (TORADOL) injection 15 mg (15 mg Intravenous Given 5/4/25 2247)   iohexol (OMNIPAQUE) 350 MG/ML injection (MULTI-DOSE) 100 mL (100 mL Intravenous Given 5/5/25 0046)       ED Risk Strat Scores                    No data recorded                            History of Present Illness       Chief Complaint   Patient presents with    Vomiting     Patient reports nausea, vomiting, mid upper abdominal pain today.        Past Medical History:   Diagnosis Date    Prediabetes       Past Surgical History:   Procedure Laterality Date     KNEE CARTILAGE SURGERY Left 03/21/2023      History reviewed. No pertinent family history.   Social History     Tobacco Use    Smoking status: Never    Smokeless tobacco: Never   Substance Use Topics    Alcohol use: Not Currently    Drug use: Never      E-Cigarette/Vaping      E-Cigarette/Vaping Substances      I have reviewed and agree with the history as documented.     A 29-year-old female with no significant past medical history; presents with vomiting and midepigastric abdominal pain that began around 10 am this morning.  Patient states she has been unable to tolerate PO since symptoms began.  She has not taken anything for her symptoms.  She denies associated diarrhea.  She has not had fever, chills, chest pain, shortness of breath, dysuria, peripheral edema and rashes.      History provided by:  Patient and medical records   used: Yes ([employer] [id number])    Vomiting  Associated symptoms: abdominal pain        Review of Systems   Gastrointestinal:  Positive for abdominal pain, nausea and vomiting.   All other systems reviewed and are negative.      Objective       ED Triage Vitals [05/04/25 2154]   Temperature Pulse Blood Pressure Respirations SpO2 Patient Position - Orthostatic VS   97.7 °F (36.5 °C) 88 154/79 20 97 % Sitting      Temp Source Heart Rate Source BP Location FiO2 (%) Pain Score    Oral Monitor Right arm -- 8      Vitals      Date and Time Temp Pulse SpO2 Resp BP Pain Score FACES Pain Rating User   05/04/25 2343 -- 78 97 % 17 99/53 5 -- LB   05/04/25 2247 -- -- -- -- -- 8 -- ES   05/04/25 2154 97.7 °F (36.5 °C) 88 97 % 20 154/79 8 -- TMS            Physical Exam  General Appearance: alert and oriented, nad, non toxic appearing  Skin:  Warm, dry, intact.  No cyanosis  HEENT: Atraumatic, normocephalic.  No eye drainage.  Normal hearing.  Moist mucous membranes.    Neck: Supple, trachea midline  Cardiac: RRR; no murmurs, rub, gallops.  No pedal edema, 2+ pulses  Pulmonary: lungs  CTAB; no wheezes, rales, rhonchi  Gastrointestinal: abdomen soft, RLQ tenderness, nondistended; no guarding or rebound tenderness; good bowel sounds, no mass or bruits  Extremities:  No deformities.  No calf tenderness, no clubbing  Neuro:  no focal motor or sensory deficits, CN 2-12 grossly intact  Psych:  Normal mood and affect, normal judgement and insight       Results Reviewed       Procedure Component Value Units Date/Time    Urine Microscopic [[id number]]  (Abnormal) Collected: 05/04/25 2252    Lab Status: Final result Specimen: Urine, Clean Catch Updated: 05/04/25 2316     RBC, UA 10-20 /hpf      WBC, UA 4-10 /hpf      Epithelial Cells Occasional /hpf      Bacteria, UA Occasional /hpf      MUCUS THREADS Occasional    Comprehensive metabolic panel [[id number]] Collected: 05/04/25 2242    Lab Status: Final result Specimen: Blood from Arm, Left Updated: 05/04/25 2311     Sodium 140 mmol/L      Potassium 3.6 mmol/L      Chloride 104 mmol/L      CO2 28 mmol/L      ANION GAP 8 mmol/L      BUN 13 mg/dL      Creatinine 0.85 mg/dL      Glucose 83 mg/dL      Calcium 9.5 mg/dL      AST 17 U/L      ALT 17 U/L      Alkaline Phosphatase 67 U/L      Total Protein 8.3 g/dL      Albumin 4.8 g/dL      Total Bilirubin 0.61 mg/dL      eGFR 93 ml/min/1.73sq m     Narrative:      [employer] guidelines for Chronic Kidney Disease (CKD):     Stage 1 with normal or high GFR (GFR > 90 mL/min/1.73 square meters)    Stage 2 Mild CKD (GFR = 60-89 mL/min/1.73 square meters)    Stage 3A Moderate CKD (GFR = 45-59 mL/min/1.73 square meters)    Stage 3B Moderate CKD (GFR = 30-44 mL/min/1.73 square meters)    Stage 4 Severe CKD (GFR = 15-29 mL/min/1.73 square meters)    Stage 5 End Stage CKD (GFR <15 mL/min/1.73 square meters)  Note: GFR calculation is accurate only with a steady state creatinine    Lipase [[id number]]  (Normal) Collected: 05/04/25 2242    Lab Status: Final result Specimen: Blood from Arm, Left Updated:  05/04/25 2311     Lipase 14 u/L     CBC and differential [[id number]] Collected: 05/04/25 2242    Lab Status: Final result Specimen: Blood from Arm, Left Updated: 05/04/25 2255     WBC 7.04 Thousand/uL      RBC 4.63 Million/uL      Hemoglobin 14.3 g/dL      Hematocrit 43.0 %      MCV 93 fL      MCH 30.9 pg      MCHC 33.3 g/dL      RDW 13.2 %      MPV 10.5 fL      Platelets 269 Thousands/uL      nRBC 0 /100 WBCs      Segmented % 64 %      Immature Grans % 0 %      Lymphocytes % 30 %      Monocytes % 6 %      Eosinophils Relative 0 %      Basophils Relative 0 %      Absolute Neutrophils 4.41 Thousands/µL      Absolute Immature Grans 0.01 Thousand/uL      Absolute Lymphocytes 2.13 Thousands/µL      Absolute Monocytes 0.45 Thousand/µL      Eosinophils Absolute 0.01 Thousand/µL      Basophils Absolute 0.03 Thousands/µL     Urine Macroscopic, POC [[id number]]  (Abnormal) Collected: 05/04/25 2252    Lab Status: Final result Specimen: Urine Updated: 05/04/25 2253     Color, UA Yellow     Clarity, UA Clear     pH, UA 7.5     Leukocytes, UA Negative     Nitrite, UA Negative     Protein,  (2+) mg/dl      Glucose, UA Negative mg/dl      Ketones, UA Trace mg/dl      Urobilinogen, UA 0.2 E.U./dl      Bilirubin, UA Negative     Occult Blood, UA Negative     Specific Gravity, UA 1.020    Narrative:      CLINITEK RESULT    POCT pregnancy, urine [[id number]]  (Normal) Collected: 05/04/25 2247    Lab Status: Final result Updated: 05/04/25 2247     EXT Preg Test, Ur Negative     Control Valid            CT abdomen pelvis with contrast   Final Interpretation by Josh Diane MD (05/05 0200)      No acute intra-abdominal/pelvic abnormalities noted with findings detailed above         Workstation performed: [id number]             Procedures    ED Medication and Procedure Management   Prior to Admission Medications   Prescriptions Last Dose Informant Patient Reported? Taking?   aluminum-magnesium hydroxide-simethicone (MAALOX)  200-200-20 MG/5ML SUSP   No No   Sig: Take 30 mL by mouth 4 (four) times a day (before meals and at bedtime)   cyclobenzaprine (FLEXERIL) 10 mg tablet   No No   Sig: Take 1 tablet (10 mg total) by mouth 2 (two) times a day as needed for muscle spasms   Patient not taking: Reported on 10/16/2022   dicyclomine (BENTYL) 20 mg tablet   No No   Sig: Take 1 tablet (20 mg total) by mouth 2 (two) times a day for 5 days   diphenhydrAMINE (BENADRYL) 25 mg tablet   No No   Sig: Take 1 tablet (25 mg total) by mouth every 6 (six) hours   Patient not taking: No sig reported   famotidine (PEPCID) 20 mg tablet   No No   Sig: Take 1 tablet (20 mg total) by mouth 2 (two) times a day   famotidine (PEPCID) 20 mg tablet   No No   Sig: Take 1 tablet (20 mg total) by mouth 2 (two) times a day   naproxen (NAPROSYN) 500 mg tablet   No No   Sig: Take 1 tablet (500 mg total) by mouth 2 (two) times a day as needed for mild pain   Patient not taking: Reported on 10/16/2022   ondansetron (ZOFRAN-ODT) 4 mg disintegrating tablet   No No   Sig: Take 1 tablet (4 mg total) by mouth every 6 (six) hours as needed for nausea or vomiting   ondansetron (ZOFRAN-ODT) 4 mg disintegrating tablet   No No   Sig: Take 1 tablet (4 mg total) by mouth every 6 (six) hours as needed for nausea or vomiting      Facility-Administered Medications: None     Discharge Medication List as of 5/5/2025  2:13 AM        START taking these medications    Details   ondansetron (ZOFRAN) 4 mg tablet Take 1 tablet (4 mg total) by mouth every 6 (six) hours, Starting Mon 5/5/2025, Normal           CONTINUE these medications which have NOT CHANGED    Details   aluminum-magnesium hydroxide-simethicone (MAALOX) 200-200-20 MG/5ML SUSP Take 30 mL by mouth 4 (four) times a day (before meals and at bedtime), Starting Mon 1/13/2025, Normal      cyclobenzaprine (FLEXERIL) 10 mg tablet Take 1 tablet (10 mg total) by mouth 2 (two) times a day as needed for muscle spasms, Starting Sun 7/10/2022,  Normal      dicyclomine (BENTYL) 20 mg tablet Take 1 tablet (20 mg total) by mouth 2 (two) times a day for 5 days, Starting Tue 2/16/2021, Until Sun 2/21/2021, Print      diphenhydrAMINE (BENADRYL) 25 mg tablet Take 1 tablet (25 mg total) by mouth every 6 (six) hours, Starting Wed 5/29/2019, Print      !! famotidine (PEPCID) 20 mg tablet Take 1 tablet (20 mg total) by mouth 2 (two) times a day, Starting Sat 4/22/2023, Normal      !! famotidine (PEPCID) 20 mg tablet Take 1 tablet (20 mg total) by mouth 2 (two) times a day, Starting Mon 1/13/2025, Normal      naproxen (NAPROSYN) 500 mg tablet Take 1 tablet (500 mg total) by mouth 2 (two) times a day as needed for mild pain, Starting Sun 7/10/2022, Normal      !! ondansetron (ZOFRAN-ODT) 4 mg disintegrating tablet Take 1 tablet (4 mg total) by mouth every 6 (six) hours as needed for nausea or vomiting, Starting Sat 4/22/2023, Normal      !! ondansetron (ZOFRAN-ODT) 4 mg disintegrating tablet Take 1 tablet (4 mg total) by mouth every 6 (six) hours as needed for nausea or vomiting, Starting Mon 1/13/2025, Normal       !! - Potential duplicate medications found. Please discuss with provider.        No discharge procedures on file.  ED SEPSIS DOCUMENTATION   Time reflects when diagnosis was documented in both MDM as applicable and the Disposition within this note       Time User Action Codes Description Comment    5/5/2025  2:12 AM Bryce Ricketts Add [R10.9] Abdominal pain     5/5/2025  2:12 AM Bryce Ricketts Add [R11.2] Nausea and vomiting                  Rhianna Salazar DO  05/07/25 0816

## 2025-05-05 NOTE — ED CARE HANDOFF
Emergency Department Sign Out Note        Sign out and transfer of care from Dr. Salazar. See Separate Emergency Department note.     The patient, Lary Dahl, was evaluated by the previous provider for abdominal pain, vomiting.    Workup Completed:  Labs, CT.    ED Course / Workup Pending (followup):        No data recorded                          ED Course as of 05/05/25 0226   Mon May 05, 2025   0209 Patient received in signout for follow-up of CT results.   0210 CT abdomen pelvis with contrast  CT scan results available at this time:    IMPRESSION:     No acute intra-abdominal/pelvic abnormalities noted     0210 Patient informed about nonacute CT results, patient feeling better, requesting to drink water, stable for discharge as per signout, will send Zofran prescription.   [employer] #[id number] used for translation.     Procedures  Medical Decision Making  Amount and/or Complexity of Data Reviewed  Labs: ordered.  Radiology: ordered. Decision-making details documented in ED Course.    Risk  Prescription drug management.            Disposition  Final diagnoses:   Abdominal pain   Nausea and vomiting     Time reflects when diagnosis was documented in both MDM as applicable and the Disposition within this note       Time User Action Codes Description Comment    5/5/2025  2:12 AM Bryce Ricketts Add [R10.9] Abdominal pain     5/5/2025  2:12 AM Bryce Ricketts Add [R11.2] Nausea and vomiting           ED Disposition       ED Disposition   Discharge    Condition   Stable    Date/Time   Mon May 5, 2025  2:12 AM    Comment   Lary Dahl discharge to home/self care.                   Follow-up Information       Follow up With Specialties Details Why Contact Info    Ar Hardy,  Family Medicine Schedule an appointment as soon as possible for a visit   [address] [place] [id number] [phone]            Patient's Medications   Discharge Prescriptions    ONDANSETRON  (ZOFRAN) 4 MG TABLET    Take 1 tablet (4 mg total) by mouth every 6 (six) hours       Start Date: 5/5/2025  End Date: --       Order Dose: 4 mg       Quantity: 12 tablet    Refills: 0     No discharge procedures on file.       ED Provider  Electronically Signed by     Bryce Ricketts MD  05/05/25 0213       Bryce Ricketts MD  05/05/25 0226

## 2025-05-30 DIAGNOSIS — Z00.6 ENCOUNTER FOR EXAMINATION FOR NORMAL COMPARISON OR CONTROL IN CLINICAL RESEARCH PROGRAM: ICD-10-CM
